# Patient Record
Sex: MALE | Race: WHITE
[De-identification: names, ages, dates, MRNs, and addresses within clinical notes are randomized per-mention and may not be internally consistent; named-entity substitution may affect disease eponyms.]

---

## 2017-10-19 ENCOUNTER — HOSPITAL ENCOUNTER (INPATIENT)
Dept: HOSPITAL 33 - MED SURG | Age: 62
LOS: 2 days | Discharge: HOME | DRG: 300 | End: 2017-10-21
Attending: FAMILY MEDICINE | Admitting: FAMILY MEDICINE
Payer: MEDICARE

## 2017-10-19 DIAGNOSIS — N17.9: ICD-10-CM

## 2017-10-19 DIAGNOSIS — I10: ICD-10-CM

## 2017-10-19 DIAGNOSIS — I73.9: ICD-10-CM

## 2017-10-19 DIAGNOSIS — E11.621: ICD-10-CM

## 2017-10-19 DIAGNOSIS — E78.5: ICD-10-CM

## 2017-10-19 DIAGNOSIS — Z79.4: ICD-10-CM

## 2017-10-19 DIAGNOSIS — I74.3: Primary | ICD-10-CM

## 2017-10-19 DIAGNOSIS — L97.519: ICD-10-CM

## 2017-10-19 DIAGNOSIS — E87.5: ICD-10-CM

## 2017-10-19 LAB
ANION GAP SERPL CALC-SCNC: 13.6 MEQ/L (ref 5–15)
BUN SERPL-MCNC: 38 MG/DL (ref 9–20)
CHLORIDE SERPL-SCNC: 104 MEQ/L (ref 98–107)
CO2 SERPL-SCNC: 23.2 MEQ/L (ref 21–32)
GLUCOSE SERPL-MCNC: 156 MG/DL (ref 70–110)
MCH RBC QN AUTO: 30.8 PG (ref 26–32)
PLATELET # BLD AUTO: 204 K/MM3 (ref 150–450)
POTASSIUM SERPLBLD-SCNC: 6 MEQ/L (ref 3.5–5.1)
RBC # BLD AUTO: 3.95 M/MM3 (ref 4.1–5.6)
SODIUM SERPL-SCNC: 135 MEQ/L (ref 136–145)
WBC # BLD AUTO: 6.3 K/MM3 (ref 4–10.5)

## 2017-10-19 PROCEDURE — 80048 BASIC METABOLIC PNL TOTAL CA: CPT

## 2017-10-19 PROCEDURE — 85027 COMPLETE CBC AUTOMATED: CPT

## 2017-10-19 PROCEDURE — 93005 ELECTROCARDIOGRAM TRACING: CPT

## 2017-10-19 PROCEDURE — 36415 COLL VENOUS BLD VENIPUNCTURE: CPT

## 2017-10-19 PROCEDURE — 83036 HEMOGLOBIN GLYCOSYLATED A1C: CPT

## 2017-10-19 PROCEDURE — 93926 LOWER EXTREMITY STUDY: CPT

## 2017-10-19 PROCEDURE — 73630 X-RAY EXAM OF FOOT: CPT

## 2017-10-19 PROCEDURE — 82962 GLUCOSE BLOOD TEST: CPT

## 2017-10-19 PROCEDURE — 82550 ASSAY OF CK (CPK): CPT

## 2017-10-19 PROCEDURE — 84134 ASSAY OF PREALBUMIN: CPT

## 2017-10-19 PROCEDURE — 80053 COMPREHEN METABOLIC PANEL: CPT

## 2017-10-19 RX ADMIN — SIMVASTATIN SCH MG: 20 TABLET, FILM COATED ORAL at 22:26

## 2017-10-19 RX ADMIN — FAMOTIDINE SCH MG: 20 TABLET, FILM COATED ORAL at 22:24

## 2017-10-19 RX ADMIN — OXYCODONE HYDROCHLORIDE AND ACETAMINOPHEN PRN TAB: 10; 325 TABLET ORAL at 15:03

## 2017-10-19 RX ADMIN — PREGABALIN SCH MG: 100 CAPSULE ORAL at 22:25

## 2017-10-19 NOTE — XRAY
Indication: 5th digit diabetic foot ulcer here



Comparison: October 13, 2017.



3 nonweightbearing views of the right foot unchanged again demonstrating

minimal 1st MTP degenerative changes, 5th metatarsal head osteophyte, tiny

heel spurs, and mild soft tissue swelling lateral to the 5th MTP.  No

new/acute findings.

## 2017-10-20 LAB
ALBUMIN SERPL-MCNC: 3.3 G/DL (ref 3.4–5)
ALP SERPL-CCNC: 35 U/L (ref 46–116)
ALT SERPL-CCNC: 30 U/L (ref 12–78)
ANION GAP SERPL CALC-SCNC: 12.7 MEQ/L (ref 5–15)
ANION GAP SERPL CALC-SCNC: 13.2 MEQ/L (ref 5–15)
AST SERPL QL: 18 U/L (ref 15–37)
BILIRUB BLD-MCNC: 0.3 MG/DL (ref 0.2–1)
BUN SERPL-MCNC: 28 MG/DL (ref 9–20)
BUN SERPL-MCNC: 31 MG/DL (ref 9–20)
CHLORIDE SERPL-SCNC: 105 MEQ/L (ref 98–107)
CHLORIDE SERPL-SCNC: 106 MEQ/L (ref 98–107)
CO2 SERPL-SCNC: 22.2 MEQ/L (ref 21–32)
CO2 SERPL-SCNC: 24.1 MEQ/L (ref 21–32)
GLUCOSE SERPL-MCNC: 120 MG/DL (ref 70–110)
GLUCOSE SERPL-MCNC: 95 MG/DL (ref 70–110)
MCH RBC QN AUTO: 30.9 PG (ref 26–32)
PLATELET # BLD AUTO: 198 K/MM3 (ref 150–450)
POTASSIUM SERPLBLD-SCNC: 5.8 MEQ/L (ref 3.5–5.1)
POTASSIUM SERPLBLD-SCNC: 6.4 MEQ/L (ref 3.5–5.1)
PROT SERPL-MCNC: 6.5 GM/DL (ref 6.4–8.2)
RBC # BLD AUTO: 3.81 M/MM3 (ref 4.1–5.6)
SODIUM SERPL-SCNC: 135 MEQ/L (ref 136–145)
SODIUM SERPL-SCNC: 136 MEQ/L (ref 136–145)
WBC # BLD AUTO: 6.8 K/MM3 (ref 4–10.5)

## 2017-10-20 RX ADMIN — SODIUM POLYSTYRENE SULFONATE SCH G: 15 SUSPENSION ORAL; RECTAL at 21:53

## 2017-10-20 RX ADMIN — SODIUM POLYSTYRENE SULFONATE SCH G: 15 SUSPENSION ORAL; RECTAL at 06:32

## 2017-10-20 RX ADMIN — SIMVASTATIN SCH MG: 20 TABLET, FILM COATED ORAL at 21:54

## 2017-10-20 RX ADMIN — SODIUM POLYSTYRENE SULFONATE SCH G: 15 SUSPENSION ORAL; RECTAL at 17:55

## 2017-10-20 RX ADMIN — PREGABALIN SCH MG: 100 CAPSULE ORAL at 21:54

## 2017-10-20 RX ADMIN — CILOSTAZOL SCH MG: 100 TABLET ORAL at 21:54

## 2017-10-20 RX ADMIN — SODIUM CHLORIDE SCH MLS/HR: 9 INJECTION, SOLUTION INTRAVENOUS at 10:29

## 2017-10-20 RX ADMIN — FAMOTIDINE SCH MG: 20 TABLET, FILM COATED ORAL at 21:54

## 2017-10-20 RX ADMIN — ASPIRIN SCH MG: 81 TABLET, COATED ORAL at 10:29

## 2017-10-20 RX ADMIN — PREGABALIN SCH MG: 100 CAPSULE ORAL at 10:30

## 2017-10-20 RX ADMIN — SODIUM POLYSTYRENE SULFONATE SCH G: 15 SUSPENSION ORAL; RECTAL at 13:59

## 2017-10-20 RX ADMIN — OXYCODONE HYDROCHLORIDE AND ACETAMINOPHEN PRN TAB: 10; 325 TABLET ORAL at 03:26

## 2017-10-20 RX ADMIN — SODIUM POLYSTYRENE SULFONATE SCH G: 15 SUSPENSION ORAL; RECTAL at 10:30

## 2017-10-20 RX ADMIN — PREGABALIN SCH MG: 100 CAPSULE ORAL at 14:00

## 2017-10-20 RX ADMIN — SITAGLIPTIN SCH MG: 50 TABLET, FILM COATED ORAL at 10:29

## 2017-10-20 RX ADMIN — FAMOTIDINE SCH MG: 20 TABLET, FILM COATED ORAL at 10:30

## 2017-10-20 NOTE — PCM.NOTE
Date and Time: 10/20/17  0616





Subjective Assessment: 





right foot still painful 


feels better if down


no fever or chills


no hx of palpitions or afib no hx of cold feet





Objective Exam


General Appearance: no apparent distress, alert


Neurologic Exam: alert, oriented x 3, cooperative, normal mood/affect, nml 

cerebellar function, sensation nml, No motor deficits


Skin Exam: normal color, warm, dry


Eye Exam: PERRL, EOMI, eyes nml inspection


Ears, Nose, Throat Exam: normal ENT inspection, pharynx normal, moist mucous 

membranes


Neck Exam: normal inspection, non-tender, supple, full range of motion


Respiratory Exam: normal breath sounds, lungs clear, No respiratory distress


Cardiovascular Exam: regular rate/rhythm, normal heart sounds


Gastrointestinal/Abdomen Exam: soft, No tenderness, No mass


Extremity Exam: normal inspection, normal range of motion, other (trace mahi 

pedal edema right 5th toe base with tender ulceration with surrounding redness 

diminshed peripheral pulses warm feet)


Back Exam: normal inspection, normal range of motion, No CVA tenderness, No 

vertebral tenderness


Male Genitalia Exam: deferred


Rectal Exam: deferred





OBJECTIVE DATA


Vital Signs: 


 Vital Signs - 24 hr











  Temp Pulse Resp BP BP Pulse Ox


 


 10/20/17 03:52  98.0 F  60  18   125/68  90 L


 


 10/19/17 23:51  97.8 F  61  18   108/72  95


 


 10/19/17 20:08  97.8 F  59 L  20   118/56  97


 


 10/19/17 15:54  97.8 F  67  20   129/69  97


 


 10/19/17 11:03  97.7 F  68  20   114/55  98


 


 10/19/17 10:36  97.7 F  68  20  114/55  114/55  98








 Pain Assessment - Last Documented











Pain Intensity                 9


 


Pain Scale Used                0-10 Pain Scale











Intake and Output: 


 Intake & Output











 10/17/17 10/18/17 10/19/17 10/20/17





 11:59 11:59 11:59 11:59


 


Intake Total    2327


 


Balance    2327


 


Weight   94.12 kg 











Lab Results: 


 Accuchecks











Date                           10/19/17


 


Date                           10/19/17


 


Date                           10/19/17


 


Time                           16:30


 


Time                           11:30


 


Accucheck Value:               90


 


Accucheck Value:               115


 


Accucheck Value:               228











 Lab Results-Last 24 Hours











  10/19/17 10/19/17 10/20/17 Range/Units





  12:40 12:40 05:00 


 


WBC  6.3    (4.0-10.5)  K/mm3


 


RBC  3.95 L    (4.1-5.6)  M/mm3


 


Hgb  12.2 L    (12.5-18.0)  gm/dl


 


Hct  38.8 L    (42-50)  %


 


MCV  98.2    ()  fl


 


MCH  30.8    (26-32)  pg


 


MCHC  31.4 L    (32-36)  g/dl


 


RDW  14.9 H    (11.5-14.0)  %


 


Plt Count  204    (150-450)  K/mm3


 


MPV  10.7 H    (6-9.5)  fl


 


Sodium   135 L   (136-145)  mEq/L


 


Potassium   6.0 H*   (3.5-5.1)  mEq/L


 


Chloride   104   ()  mEq/L


 


Carbon Dioxide   23.2   (21-32)  mEq/L


 


Anion Gap   13.6   (5-15)  MEQ/L


 


BUN   38 H   (9-20)  mg/dL


 


Creatinine   2.44 H   (0.55-1.30)  mg/dl


 


Estimated GFR   29   ML/MIN


 


Glucose   156 H   ()  MG/DL


 


Hemoglobin A1c    7.3 H  (4.5-6.2)  


 


Calcium   9.1   (8.5-10.1)  mg/dL


 


Total Bilirubin     (0.2-1.0)  mg/dL


 


AST     (15-37)  U/L


 


ALT     (12-78)  U/L


 


Alkaline Phosphatase     ()  U/L


 


Serum Total Protein     (6.4-8.2)  gm/dL


 


Albumin     (3.4-5.0)  g/dL


 


Prealbumin   34.8   (18.0-35.7)  mg/dL














  10/20/17 10/20/17 Range/Units





  05:00 05:00 


 


WBC  6.8   (4.0-10.5)  K/mm3


 


RBC  3.81 L   (4.1-5.6)  M/mm3


 


Hgb  11.8 L   (12.5-18.0)  gm/dl


 


Hct  37.6 L   (42-50)  %


 


MCV  98.7   ()  fl


 


MCH  30.9   (26-32)  pg


 


MCHC  31.4 L   (32-36)  g/dl


 


RDW  14.8 H   (11.5-14.0)  %


 


Plt Count  198   (150-450)  K/mm3


 


MPV  11.1 H   (6-9.5)  fl


 


Sodium   135 L  (136-145)  mEq/L


 


Potassium   6.4 H*  (3.5-5.1)  mEq/L


 


Chloride   106  ()  mEq/L


 


Carbon Dioxide   22.2  (21-32)  mEq/L


 


Anion Gap   13.2  (5-15)  MEQ/L


 


BUN   31 H  (9-20)  mg/dL


 


Creatinine   2.16 H  (0.55-1.30)  mg/dl


 


Estimated GFR   33  ML/MIN


 


Glucose   95  ()  MG/DL


 


Hemoglobin A1c    (4.5-6.2)  


 


Calcium   8.6  (8.5-10.1)  mg/dL


 


Total Bilirubin   0.30  (0.2-1.0)  mg/dL


 


AST   18  (15-37)  U/L


 


ALT   30  (12-78)  U/L


 


Alkaline Phosphatase   35 L  ()  U/L


 


Serum Total Protein   6.5  (6.4-8.2)  gm/dL


 


Albumin   3.3 L  (3.4-5.0)  g/dL


 


Prealbumin    (18.0-35.7)  mg/dL











Radiology Exams: 


 Radiology Procedures











 Category Date Time Status


 


 ARTERIAL UNILAT/LTD LOWER EXT [US] Routine Exams  10/20/17 Ordered


 


 FOOT (MINIMUM 3 VIEWS) Routine Exams  10/19/17 11:30 Completed











Multi-Disciplinary Progress Notes: 


 Multi-Disciplinary Progress Notes





10/19/17 15:03 Pharmacy Note by Hola Grier


PHARMACY VANCOMYCIN DOSING


WE GAVE HIM 1.5H VANCOMYCIN LOADING DOSE, TO BE FOLLOWED BY 1G Q 24 HRS


CREATININ ELEVATED AT 2.44 WITH ESTIMATED CREATININE CLEARANCE 30 ML/MIN


WILL CHECK VANCOMYCIN TROUGH AT 0930 ON JERZY 10/22/17





Initialized on 10/19/17 15:03 - END OF NOTE

















Assessment/Plan


(1) Diabetic foot ulcer


Current Visit: Yes   Status: Acute   


Assessment & Plan: 


check arterial u/s rule out ischemic toe


on tele NSR since arrival


Code(s): E11.621 - TYPE 2 DIABETES MELLITUS WITH FOOT ULCER; L97.509 - NON-

PRESSURE CHRONIC ULCER OTH PRT UNSP FOOT W UNSP SEVERITY   





(2) Acute renal failure


Current Visit: Yes   Status: Acute   


Assessment & Plan: 


secondary to bactrim as outpatient


continue hydration


kayexalate start stat


inulin + D50 now


repeat K in 6 h


d/c lisinopril


continue tele








(3) Hyperkalemia


Current Visit: Yes   Status: Acute   Code(s): E87.5 - HYPERKALEMIA   





(4) Diabetes mellitus


Current Visit: Yes   Status: Acute   Code(s): E11.9 - TYPE 2 DIABETES MELLITUS 

WITHOUT COMPLICATIONS   





(5) Failure of outpatient treatment


Current Visit: Yes   Status: Acute   Code(s): Z78.9 - OTHER SPECIFIED HEALTH 

STATUS

## 2017-10-20 NOTE — XRAY
Indication: PVD.  Ulcer.



Two-dimensional sonogram and color Doppler imaging of the major arteries of

the right leg was performed.



Comparison: None



Popliteal artery is occluded.  Elsewhere minimal scattered arteriosclerotic

disease seen of the visualized common femoral, superficial femoral, posterior

tibial, and dorsal pedal arteries without critical stenosis/obstruction.

Common femoral artery demonstrates normal multiphasic arterial waveforms.  The

superficial femoral, posterior tibial, and dorsal pedal arteries demonstrates

monophasic arterial waveforms.



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

monophasic arterial flow.  No critical stenosis/obstruction in the upper leg.

## 2017-10-21 VITALS — SYSTOLIC BLOOD PRESSURE: 117 MMHG | HEART RATE: 64 BPM | DIASTOLIC BLOOD PRESSURE: 59 MMHG

## 2017-10-21 VITALS — OXYGEN SATURATION: 94 %

## 2017-10-21 LAB
ANION GAP SERPL CALC-SCNC: 14 MEQ/L (ref 5–15)
BUN SERPL-MCNC: 23 MG/DL (ref 9–20)
CHLORIDE SERPL-SCNC: 106 MEQ/L (ref 98–107)
CO2 SERPL-SCNC: 23.9 MEQ/L (ref 21–32)
GLUCOSE SERPL-MCNC: 108 MG/DL (ref 70–110)
MCH RBC QN AUTO: 30.7 PG (ref 26–32)
PLATELET # BLD AUTO: 193 K/MM3 (ref 150–450)
POTASSIUM SERPLBLD-SCNC: 5.2 MEQ/L (ref 3.5–5.1)
RBC # BLD AUTO: 3.8 M/MM3 (ref 4.1–5.6)
SODIUM SERPL-SCNC: 139 MEQ/L (ref 136–145)
WBC # BLD AUTO: 5.5 K/MM3 (ref 4–10.5)

## 2017-10-21 RX ADMIN — FAMOTIDINE SCH MG: 20 TABLET, FILM COATED ORAL at 08:13

## 2017-10-21 RX ADMIN — CILOSTAZOL SCH MG: 100 TABLET ORAL at 08:13

## 2017-10-21 RX ADMIN — PREGABALIN SCH MG: 100 CAPSULE ORAL at 08:13

## 2017-10-21 RX ADMIN — OXYCODONE HYDROCHLORIDE AND ACETAMINOPHEN PRN TAB: 10; 325 TABLET ORAL at 03:52

## 2017-10-21 RX ADMIN — SODIUM CHLORIDE SCH MLS/HR: 9 INJECTION, SOLUTION INTRAVENOUS at 08:13

## 2017-10-21 RX ADMIN — ASPIRIN SCH MG: 81 TABLET, COATED ORAL at 08:13

## 2017-10-21 RX ADMIN — SITAGLIPTIN SCH MG: 50 TABLET, FILM COATED ORAL at 08:13

## 2017-10-21 NOTE — PCM.DCORD
- Discharge


Discharge Date: 10/21/17


Disposition: Home, Self-Care


Condition: Stable


Prescriptions: 


New


   Clindamycin HCl 300 mg PO QID #28 capsule


   Atorvastatin Calcium [Lipitor] 80 mg PO DAILY #30 tablet


   Nicotine [Nicoderm Cq] 21 mg TD DAILY #30 patch.td24


   Cilostazol 100 mg*** [Pletal 100 MG***] 100 mg PO BID #60 tablet





Continue


   Glipizide 2.5 mg*** [Glucotrol Xl 2.5 MG***] 10 mg PO BID


   Fenofibrate Nanocrystallized [Fenofibrate] 145 mg PO DAILY


   Linagliptin [Tradjenta] 5 mg PO DAILY


   Ranitidine HCl 150 mg PO BID


   Hydrocodone/Acetaminophen [Hydrocodon-Acetaminophn ] 1 tab PO Q6H PRN 

PRN


     PRN Reason: Pain


   Pregabalin [Lyrica 100Mg***] 100 mg PO TID


   Aspirin 81 gm Chew*** [Baby Aspirin 81 mg Chew***] 81 mg PO DAILY





Discontinued


   Pravastatin Sodium 10 mg [Pravachol 10 MG] 80 mg PO DAILY


   Metformin HCl 500 mg*** [Glucophage 500 MG] 500 mg PO BID


   Lisinopril 40 mg PO DAILY


   Canagliflozin [Invokana] 300 mg PO HS


Additional Instructions: 


If your foot pain worsens, your foot becomes cold, dusky or blue, you need to 

present to the emergency department immediately.


You will be contacted by our clinic about an appointment with a specialist for 

your blood vessels.


Please do not smoke and only take the medications as prescribed. 


Drink plenty of water.


Follow up with: 


REYNA DE LA ROSA MD [Primary Care Provider] - 1 Week

## 2017-10-21 NOTE — PCM.DS
Discharge Summary


Date of Admission: 


10/19/17 10:30





Date of Discharge: 


10/21/17





Admitting Physician: 


MARYLOU MAHAJAN





Primary Care Provider: 


REYNA DE LA ROSA








Allergies


Allergies





codeine [Codeine] Adverse Reaction (Intermediate, Verified 10/19/17 10:57)


 Nausea and Vomiting











Hospital Summary





- Hospital Course


Hospital Course: 





He presented to Dr. Mahajan's office with very painful ulcer on the right 5th 

toe that was treated for 1 week with bactrim and not improving. She admitted 

him for iv antibiotics. On evaluation on hospital day 1 he noted the pain was 

better if he had his legs off the bed. Arterial ultrasound showed popliteal 

artery occlusion with diminished monophasic flow below. His foot was warm with 

good cap refill of 2 secs and no pain except on the tiny ulcerated area on the 

5th toe about 5mm on the base of the toe. It had a hard dark necrotic center 

that on 10/21/17 was cleaned with betadine and debrided with 11 blade of the 

tiny central core of necrotic tissue with good viable tissue under this. It was 

covered with bacitracin and nonstick dressing and wrapped. He was started on 

pletal on 10/20/17 as well as his statin and aspirin and he stated the pain was 

improved and only at the ulcerated site. With the acute kidney failure we 

discussed with lack of significant symptoms now would like to improve renal 

function prior to any contrast studies or procedures for the leg. 





On arrival he was found to have hyperkalemia and acute renal failure due to the 

Bactim. He was treated with iv hydration initially and on 10/20/17 the K was 

actually a little higher his lisinopril was stopped. He was treated with iv 

regular insulin and dextrose as well as kayexalate and continued iv hydration 

and it improved. His renal function continued to slowly improve as well. With 

the hydration. His blood pressure remained controlled off the lisinopril. 





For his diabetes his invokona, metformin, and glipizide were held. his sugars 

were well controlled on sliding scale and A1c of 7.3% was done.





We discussed his renal failure and desire for continued iv hydration and 

monitoring of the potassium inpatient; however he wanted to leave A on 10/20 

due to animals he had at home to care for but we convinced him to stay the 

night to hydrate and monitor the K. We discussed possible inpatient referral 

for the occluded popliteal artery but he is insistent on going home now as he 

"feels much better". He has no current pain in the foot except at the small 

ulcerated area that was debrided. 





We had discussion about importance of smoking cessation and risk of toe, foot, 

or leg amputation should he  restart smoking. 


Also discussed importance of only taking the medications as discussed at 

discharge until f/u. Stop the invokana, metformin, lisinopril, and bactrim.


Close f/u with recheck wound this week. 


Present to ED immediately with cold, dusky, or painful foot.


Stay well hydrated plenty of water. 


Repeat labs next week. 


We are making referral to Dr. López outpatient for further evaluation of his 

peripheral artery disease. 





- Vitals & Intake/Output


Vital Signs: 





 Vital Signs











Temperature  98.1 F   10/21/17 07:49


 


Pulse Rate  64   10/21/17 07:49


 


Respiratory Rate  18   10/21/17 07:49


 


Blood Pressure  117/59   10/21/17 07:49


 


O2 Sat by Pulse Oximetry  94 L  10/21/17 07:49











Intake & Output: 





 Intake & Output











 10/18/17 10/19/17 10/20/17 10/21/17





 11:59 11:59 11:59 11:59


 


Intake Total   3127 5413


 


Balance   3127 5413


 


Weight  94.12 kg  94.12 kg














- Lab


Result Diagrams: 


 10/21/17 05:24





 10/21/17 05:24


Lab Results-Last 24 Hrs: 





 Accuchecks











Date                           10/21/17


 


Date                           10/20/17


 


Date                           10/20/17


 


Date                           10/20/17


 


Time                           07:30


 


Time                           21:00


 


Time                           16:30


 


Time                           12:53


 


Accucheck Value:               111


 


Accucheck Value:               144


 


Accucheck Value:               154


 


Accucheck Value:               88











 Lab Results-Last 24 Hours











  10/20/17 10/21/17 10/21/17 Range/Units





  13:08 05:24 05:24 


 


WBC   5.5   (4.0-10.5)  K/mm3


 


RBC   3.80 L   (4.1-5.6)  M/mm3


 


Hgb   11.7 L   (12.5-18.0)  gm/dl


 


Hct   37.4 L   (42-50)  %


 


MCV   98.4   ()  fl


 


MCH   30.7   (26-32)  pg


 


MCHC   31.3 L   (32-36)  g/dl


 


RDW   14.5 H   (11.5-14.0)  %


 


Plt Count   193   (150-450)  K/mm3


 


MPV   10.8 H   (6-9.5)  fl


 


Sodium  136   139  (136-145)  mEq/L


 


Potassium  5.8 H   5.2 H  (3.5-5.1)  mEq/L


 


Chloride  105   106  ()  mEq/L


 


Carbon Dioxide  24.1   23.9  (21-32)  mEq/L


 


Anion Gap  12.7   14.0  (5-15)  MEQ/L


 


BUN  28 H   23 H  (9-20)  mg/dL


 


Creatinine  2.13 H   1.94 H  (0.55-1.30)  mg/dl


 


Estimated GFR  34   37  ML/MIN


 


Glucose  120 H   108  ()  MG/DL


 


Calcium  8.6   8.7  (8.5-10.1)  mg/dL


 


Creatine Kinase     ()  U/L














  10/21/17 Range/Units





  05:24 


 


WBC   (4.0-10.5)  K/mm3


 


RBC   (4.1-5.6)  M/mm3


 


Hgb   (12.5-18.0)  gm/dl


 


Hct   (42-50)  %


 


MCV   ()  fl


 


MCH   (26-32)  pg


 


MCHC   (32-36)  g/dl


 


RDW   (11.5-14.0)  %


 


Plt Count   (150-450)  K/mm3


 


MPV   (6-9.5)  fl


 


Sodium   (136-145)  mEq/L


 


Potassium   (3.5-5.1)  mEq/L


 


Chloride   ()  mEq/L


 


Carbon Dioxide   (21-32)  mEq/L


 


Anion Gap   (5-15)  MEQ/L


 


BUN   (9-20)  mg/dL


 


Creatinine   (0.55-1.30)  mg/dl


 


Estimated GFR   ML/MIN


 


Glucose   ()  MG/DL


 


Calcium   (8.5-10.1)  mg/dL


 


Creatine Kinase  75  ()  U/L











Micro Results-Entire Visit: 





 Accuchecks











Date                           10/21/17


 


Date                           10/20/17


 


Date                           10/20/17


 


Date                           10/20/17


 


Time                           07:30


 


Time                           21:00


 


Time                           16:30


 


Time                           12:53


 


Accucheck Value:               111


 


Accucheck Value:               144


 


Accucheck Value:               154


 


Accucheck Value:               88

















- Radiology Exams


Ordered Rad Exams-Entire Visit: 





 Radiology Procedures











 Category Date Time Status


 


 ARTERIAL UNILAT/LTD LOWER EXT [US] Routine Exams  10/20/17 Completed


 


 FOOT (MINIMUM 3 VIEWS) Routine Exams  10/19/17 11:30 Completed














- Procedures and Test


Procedures and Tests throughout Hospitalization: 





 Therapy Orders & Screens





10/19/17 10:53


PT Eval & Treat (MD Order) ROUTINE 


   Evaluate: Yes


   Treat: Yes


   Reason for Eval:: diabetic foot wound


   Diagnosis: Diabetic foot wound





10/19/17 11:54


Smoking Cessation Education ONCE 


   Comment: 


   Diagnosis: Diabetic foot wound


   Smoking Status: Heavy tobacco smoker


   How long have you smoked: since he w


   Have you smoked in the past 12 months: Yes


   Approximately how many cigarettes per day: pack a day


   Do you dip or chew tobacco: No





10/19/17 13:59


EKG ROUTINE 


   Comment: High Potassium


   Diagnosis: Diabetic foot wound





10/19/17 14:57


PT Eval & Treat (MD Order) ROUTINE 


   Evaluate: Yes


   Treat: Yes


   Reason for Eval:: wound on foot with small eschar


   Diagnosis: Diabetic foot wound














Discharge Exam


General Appearance: no apparent distress, alert


Neurologic Exam: alert, oriented x 3, cooperative, normal mood/affect, nml 

cerebellar function, sensation nml, No motor deficits


Skin Exam: normal color, warm, dry


Eye Exam: PERRL, EOMI, eyes nml inspection


Ears, Nose, Throat Exam: normal ENT inspection, pharynx normal, moist mucous 

membranes


Neck Exam: normal inspection, non-tender, supple, full range of motion


Respiratory Exam: normal breath sounds, lungs clear, No respiratory distress


Cardiovascular Exam: regular rate/rhythm, normal heart sounds


Gastrointestinal/Abdomen Exam: soft, No tenderness, No mass


Extremity Exam: normal inspection, normal range of motion, other (bilateral 

feet warm with cap refill of 2 sec dorsal pedal and post tibial pulse dimished 

decreased hair. right 5th toe with tender painful ulceration 5mm on base of toe 

with 2sec cap refill in tip of 5th toe and debridement of the tiny center to 

viable center.)


Back Exam: normal inspection, normal range of motion, No CVA tenderness, No 

vertebral tenderness


Male Genitalia Exam: deferred


Rectal Exam: deferred





Final Diagnosis/Problem List





- Final Discharge Diagnosis/Problem


(1) Popliteal artery occlusion, right


Current Visit: Yes   Status: Acute   





(2) Peripheral artery disease


Current Visit: Yes   Status: Acute   





(3) Diabetic foot ulcer


Current Visit: Yes   Status: Acute   





(4) Acute renal failure


Current Visit: Yes   Status: Acute   





(5) Hyperkalemia


Current Visit: Yes   Status: Acute   





(6) Diabetes mellitus


Current Visit: Yes   Status: Acute   





- Discharge


Discharge Date: 10/21/17


Disposition: Home, Self-Care


Condition: Stable


Prescriptions: 


New


   Clindamycin HCl 300 mg PO QID #28 capsule


   Atorvastatin Calcium [Lipitor] 80 mg PO DAILY #30 tablet


   Nicotine [Nicoderm Cq] 21 mg TD DAILY #30 patch.td24


   Cilostazol 100 mg*** [Pletal 100 MG***] 100 mg PO BID #60 tablet





Continue


   Glipizide 2.5 mg*** [Glucotrol Xl 2.5 MG***] 10 mg PO BID


   Fenofibrate Nanocrystallized [Fenofibrate] 145 mg PO DAILY


   Linagliptin [Tradjenta] 5 mg PO DAILY


   Ranitidine HCl 150 mg PO BID


   Hydrocodone/Acetaminophen [Hydrocodon-Acetaminophn ] 1 tab PO Q6H PRN 

PRN


     PRN Reason: Pain


   Pregabalin [Lyrica 100Mg***] 100 mg PO TID


   Aspirin 81 gm Chew*** [Baby Aspirin 81 mg Chew***] 81 mg PO DAILY





Discontinued


   Pravastatin Sodium 10 mg [Pravachol 10 MG] 80 mg PO DAILY


   Metformin HCl 500 mg*** [Glucophage 500 MG] 500 mg PO BID


   Lisinopril 40 mg PO DAILY


   Canagliflozin [Invokana] 300 mg PO HS


Additional Instructions: 


If your foot pain worsens, your foot becomes cold, dusky or blue, you need to 

present to the emergency department immediately.


You will be contacted by our clinic about an appointment with a specialist for 

your blood vessels.


Please do not smoke and only take the medications as prescribed. 


Drink plenty of water.


Follow up with: 


REYNA DE LA ROSA MD [Primary Care Provider] - 10/27/17 3:30 pm

## 2021-06-21 ENCOUNTER — HOSPITAL ENCOUNTER (EMERGENCY)
Dept: HOSPITAL 33 - ED | Age: 66
Discharge: HOME | End: 2021-06-21
Payer: MEDICARE

## 2021-06-21 VITALS — SYSTOLIC BLOOD PRESSURE: 160 MMHG | OXYGEN SATURATION: 96 % | DIASTOLIC BLOOD PRESSURE: 73 MMHG | HEART RATE: 72 BPM

## 2021-06-21 DIAGNOSIS — I10: ICD-10-CM

## 2021-06-21 DIAGNOSIS — R10.31: Primary | ICD-10-CM

## 2021-06-21 DIAGNOSIS — Z79.899: ICD-10-CM

## 2021-06-21 DIAGNOSIS — E78.00: ICD-10-CM

## 2021-06-21 LAB
ALBUMIN SERPL-MCNC: 4.3 G/DL (ref 3.5–5)
ALP SERPL-CCNC: 70 U/L (ref 38–126)
ALT SERPL-CCNC: 18 U/L (ref 0–50)
ANION GAP SERPL CALC-SCNC: 15.4 MEQ/L (ref 5–15)
AST SERPL QL: 25 U/L (ref 17–59)
BASOPHILS # BLD AUTO: 0.02 10*3/UL (ref 0–0.4)
BASOPHILS NFR BLD AUTO: 0.2 % (ref 0–0.4)
BILIRUB BLD-MCNC: 0.5 MG/DL (ref 0.2–1.3)
BUN SERPL-MCNC: 29 MG/DL (ref 9–20)
CALCIUM SPEC-MCNC: 9.7 MG/DL (ref 8.4–10.2)
CHLORIDE SERPL-SCNC: 99 MMOL/L (ref 98–107)
CO2 SERPL-SCNC: 26 MMOL/L (ref 22–30)
CREAT SERPL-MCNC: 1.81 MG/DL (ref 0.66–1.25)
EOSINOPHIL # BLD AUTO: 0.27 10*3/UL (ref 0–0.5)
FATTY CASTS #/AREA UR COMP ASSIST: (no result) /LPF
GFR SERPLBLD BASED ON 1.73 SQ M-ARVRAT: 39.9 ML/MIN
GLUCOSE SERPL-MCNC: 114 MG/DL (ref 74–106)
GLUCOSE UR-MCNC: >=500 MG/DL
HCT VFR BLD AUTO: 38.6 % (ref 42–50)
HGB BLD-MCNC: 11.8 GM/DL (ref 12.5–18)
LIPASE SERPL-CCNC: 23 U/L (ref 23–300)
LYMPHOCYTES # SPEC AUTO: 1.77 10*3/UL (ref 1–4.6)
MCH RBC QN AUTO: 29.2 PG (ref 26–32)
MCHC RBC AUTO-ENTMCNC: 30.6 G/DL (ref 32–36)
MONOCYTES # BLD AUTO: 1.03 10*3/UL (ref 0–1.3)
PLATELET # BLD AUTO: 317 K/MM3 (ref 150–450)
POTASSIUM SERPLBLD-SCNC: 3.9 MMOL/L (ref 3.5–5.1)
PROT SERPL-MCNC: 7.6 G/DL (ref 6.3–8.2)
PROT UR STRIP-MCNC: 30 MG/DL
RBC # BLD AUTO: 4.04 M/MM3 (ref 4.1–5.6)
RBC #/AREA URNS HPF: (no result) /HPF (ref 0–2)
SODIUM SERPL-SCNC: 137 MMOL/L (ref 137–145)
WBC # BLD AUTO: 11.6 K/MM3 (ref 4–10.5)
WBC #/AREA URNS HPF: (no result) /HPF (ref 0–5)

## 2021-06-21 PROCEDURE — 96376 TX/PRO/DX INJ SAME DRUG ADON: CPT

## 2021-06-21 PROCEDURE — 36000 PLACE NEEDLE IN VEIN: CPT

## 2021-06-21 PROCEDURE — 36415 COLL VENOUS BLD VENIPUNCTURE: CPT

## 2021-06-21 PROCEDURE — 96375 TX/PRO/DX INJ NEW DRUG ADDON: CPT

## 2021-06-21 PROCEDURE — 85025 COMPLETE CBC W/AUTO DIFF WBC: CPT

## 2021-06-21 PROCEDURE — 96374 THER/PROPH/DIAG INJ IV PUSH: CPT

## 2021-06-21 PROCEDURE — 99284 EMERGENCY DEPT VISIT MOD MDM: CPT

## 2021-06-21 PROCEDURE — 83605 ASSAY OF LACTIC ACID: CPT

## 2021-06-21 PROCEDURE — 74176 CT ABD & PELVIS W/O CONTRAST: CPT

## 2021-06-21 PROCEDURE — 81001 URINALYSIS AUTO W/SCOPE: CPT

## 2021-06-21 PROCEDURE — 80053 COMPREHEN METABOLIC PANEL: CPT

## 2021-06-21 PROCEDURE — 83690 ASSAY OF LIPASE: CPT

## 2021-06-21 NOTE — ERPHSYRPT
- History of Present Illness


Time Seen by Provider: 06/21/21 10:22


Patient Subjective Stated Complaint: Patient states pain started yesterday 

afternoon and it "got bad and hasnt let up" states he hasnt slept and cannot get

comfortable. States he has a surgery scheduled to get gallbladder out next 

monday. States he recently had an ultrasound and gallbladder "has a bunch of 

sludge in it".


Triage Nursing Assessment: Patient presents to ED c/o abdominal pain. Holding 

his RLQ while walking into room. Able to collect a urine upon arrival. Bowel 

sounds presnt x4, RLQ tenderness upon palpation, rebound tenderness present


Physician History: 





65 years old male with a history of diabetes mellitus, gallstones scheduled for 

cholecystectomy next week presented in ER with chief complaint of right lower 

quadrant pain since yesterday afternoon, constant, moderate to severe intensity,

sharp in nature, nonradiating, aggravated with movements palpation/walking and 

no significant relieving factors.  Denies any associated nausea vomiting or 

urinary complaints.  No testicular pain or swelling.  He denies fever chills


Timing/Duration: yesterday, sudden, worse


Activities at Onset: rest


Quality: sharpness


Abdominal Pain Onset Location: RLQ


Pain Radiation: no radiation


Severity of Pain-Max: severe


Severity of Pain-Current: severe


Modifying Factors: Worsens With: movement, palpation


Associated Symptoms: denies symptoms


Previous symptoms: no prior history


Allergies/Adverse Reactions: 








codeine [Codeine] Adverse Reaction (Intermediate, Verified 06/21/21 10:17)


   Nausea and Vomiting





Home Medications: 








Fenofibrate Nanocrystallized [Fenofibrate] 145 mg PO DAILY 10/19/17 [History]


Pregabalin [Lyrica 100Mg***] 100 mg PO TID 10/19/17 [History]


Clopidogrel Bisulfate 75 mg*** [PLAVIX 75 MG Tablet***] 75 mg PO DAILY 06/17/21 

[History]


Empagliflozin [Jardiance] 10 mg PO DAILY 06/17/21 [History]


Insulin Detemir [Levemir] 75 units SQ BID 06/17/21 [History]


Lisinopril/Hydrochlorothiazide [Lisinopril-Hctz 10-12.5 mg Tab] 1 tab PO DAILY 

06/17/21 [History]


PANTOPRAZOLE 40 mg Tablet*** [Protonix 40MG Tablet***] 1 tab PO DAILY 06/17/21 

[History]


Aspirin 81 gm Chew*** [Baby Aspirin 81 mg Chew***] 81 mg PO DAILY 06/21/21 

[History]





Hx Tetanus, Diphtheria Vaccination/Date Given: No


Hx Influenza Vaccination/Date Given: Yes (2012)


Hx Pneumococcal Vaccination/Date Given: Yes (2012)





Travel Risk





- International Travel


Have you traveled outside of the country in past 3 weeks: No





- Coronavirus Screening


Are you exhibiting any of the following symptoms?: No


Close contact with a COVID-19 positive Pt in past 14-21 Days: No





- Vaccine Status


Have you recieved a Covid-19 vaccination: Yes


: Plyce





- Review of Systems


Constitutional: No Symptoms


Eyes: No Symptoms


Ears, Nose, & Throat: No Symptoms


Respiratory: No Symptoms


Cardiac: No Symptoms


Abdominal/Gastrointestinal: Abdominal Pain


Genitourinary Symptoms: No Symptoms


Musculoskeletal: No Symptoms


Skin: No Symptoms


Neurological: No Symptoms


Psychological: No Symptoms


Endocrine: No Symptoms


Hematologic/Lymphatic: No Symptoms


Immunological/Allergic: No Symptoms





- Past Medical History


Pertinent Past Medical History: Yes (.)


Neurological History: No Pertinent History


ENT History: No Pertinent History


Cardiac History: High Cholesterol, Hypertension


Respiratory History: No Pertinent History


Endocrine Medical History: Diabetes Type II


Musculoskeletal History: Other


GI Medical History: GERD


 History: No Pertinent History


Psycho-Social History: No Pertinent History


Male Reproductive Disorders: No Pertinent History





- Past Surgical History


Past Surgical History: Yes


Neuro Surgical History: No Pertinent History


Cardiac: Vascular Surgery


Respiratory: No Pertinent History


Gastrointestinal: No Pertinent History


Genitourinary: No Pertinent History


Musculoskeletal: Orthopedic Surgery


Male Surgical History: No Pertinent History


Other Surgical History: BACK ,NECK AND ELBOW SURG,LEFT SHOULDER. vascular 

surgery to right knee(stent placed in right knee)





- Social History


Smoking Status: Former smoker


How long have you smoked: 45 years


Exposure to second hand smoke: No


Alcohol Use: None


Drug Use: none


Patient Lives Alone: Yes


Significant Family History: no pertinent family hx





- Nursing Vital Signs


Nursing Vital Signs: 


                               Initial Vital Signs











Temperature  99.1 F   06/21/21 09:57


 


Pulse Rate  82   06/21/21 09:57


 


Blood Pressure  188/94   06/21/21 09:57


 


O2 Sat by Pulse Oximetry  95   06/21/21 09:57








                                   Pain Scale











Pain Intensity                 7

















- Physical Exam


General Appearance: no apparent distress, alert


Eye Exam: PERRL/EOMI


Ears, Nose, Throat Exam: normal ENT inspection, pharynx normal


Neck Exam: normal inspection, non-tender, supple, full range of motion


Respiratory Exam: normal breath sounds, lungs clear


Cardiovascular Exam: regular rate/rhythm, normal heart sounds


Gastrointestinal/Abdomen Exam: soft, tenderness, guarding (Right lower quadrant 

positive rebound tenderness.  Also has tenderness to the right upper quadrant 

positive Hammond sign)


Extremity Exam: normal inspection, normal range of motion


Neurologic Exam: alert, oriented x 3, cooperative


Skin Exam: normal color


**SpO2 Interpretation**: normal


SpO2: 95


O2 Delivery: Room Air


Ordered Tests: 


                               Active Orders 24 hr











 Category Date Time Status


 


 IV Insertion STAT Care  06/21/21 10:26 Active


 


 NPO (ED) STAT Care  06/21/21 10:26 Active


 


 ABDOMEN AND PELVIS W/0 CONTRAS [CT] Stat Exams  06/21/21 10:27 Completed


 


 CBC W DIFF Stat Lab  06/21/21 10:10 Completed


 


 CMP Stat Lab  06/21/21 10:10 Completed


 


 LIPASE Stat Lab  06/21/21 10:10 Completed


 


 Lactic Acid Stat Lab  06/21/21 10:26 Completed


 


 UA W/RFX UR CULTURE Stat Lab  06/21/21 10:26 Completed








Medication Summary











Generic Name Dose Route Start Last Admin





  Trade Name Freq  PRN Reason Stop Dose Admin


 


Sodium Chloride  1,000 mls @ 125 mls/hr  06/21/21 10:30  06/21/21 10:45





  Sodium Chloride 0.9% 1000 Ml  IV  07/21/21 10:29  125 mls/hr





  .Q8H ROBIN   Administration














Discontinued Medications














Generic Name Dose Route Start Last Admin





  Trade Name Freq  PRN Reason Stop Dose Admin


 


Hydromorphone HCl  1 mg  06/21/21 13:19  06/21/21 13:21





  Hydromorphone 1 Mg/Ml Injection***  IV  06/21/21 13:20  1 mg





  STAT ONE   Administration


 


Hydromorphone HCl  Confirm  06/21/21 13:20 





  Hydromorphone 1 Mg/Ml Injection***  Administered  06/21/21 13:21 





  Dose  





  1 mg  





  .ROUTE  





  .STK-MED ONE  


 


Morphine Sulfate  4 mg  06/21/21 10:26  06/21/21 10:45





  Morphine Sulfate 4 Mg Inj***  IV  06/21/21 10:27  4 mg





  STAT ONE   Administration


 


Morphine Sulfate  Confirm  06/21/21 10:44 





  Morphine Sulfate 4 Mg Inj***  Administered  06/21/21 10:45 





  Dose  





  4 mg  





  .ROUTE  





  .STK-MED ONE  


 


Morphine Sulfate  4 mg  06/21/21 12:35  06/21/21 12:38





  Morphine Sulfate 4 Mg Inj***  IV  06/21/21 12:36  4 mg





  STAT ONE   Administration


 


Morphine Sulfate  Confirm  06/21/21 12:37 





  Morphine Sulfate 4 Mg Inj***  Administered  06/21/21 12:38 





  Dose  





  4 mg  





  .ROUTE  





  .STK-MED ONE  


 


Ondansetron HCl  4 mg  06/21/21 10:26  06/21/21 10:45





  Zofran 4 Mg/2 Ml Vial**  IV  06/21/21 10:27  4 mg





  STAT ONE   Administration


 


Ondansetron HCl  Confirm  06/21/21 10:44 





  Zofran 4 Mg/2 Ml Vial**  Administered  06/21/21 10:45 





  Dose  





  4 mg  





  .ROUTE  





  .STK-MED ONE  











Lab/Rad Data: 


                           Laboratory Result Diagrams





                                 06/21/21 10:10 





                                 06/21/21 10:10 





                               Laboratory Results











  06/21/21 06/21/21 06/21/21 Range/Units





  10:26 10:26 10:10 


 


WBC     (4.0-10.5)  K/mm3


 


RBC     (4.1-5.6)  M/mm3


 


Hgb     (12.5-18.0)  gm/dl


 


Hct     (42-50)  %


 


MCV     ()  fl


 


MCH     (26-32)  pg


 


MCHC     (32-36)  g/dl


 


RDW     (11.5-14.0)  %


 


Plt Count     (150-450)  K/mm3


 


MPV     (7.5-11.0)  fl


 


Gran %     (36.0-66.0)  %


 


Eos # (Auto)     (0-0.5)  


 


Absolute Lymphs (auto)     (1.0-4.6)  


 


Absolute Monos (auto)     (0.0-1.3)  


 


Lymphocytes %     (24.0-44.0)  %


 


Monocytes %     (0.0-12.0)  %


 


Eosinophils %     (0.00-5.0)  %


 


Basophils %     (0.0-0.4)  %


 


Absolute Granulocytes     (1.4-6.9)  


 


Basophils #     (0-0.4)  


 


Sodium    137  (137-145)  mmol/L


 


Potassium    3.9  (3.5-5.1)  mmol/L


 


Chloride    99  ()  mmol/L


 


Carbon Dioxide    26  (22-30)  mmol/L


 


Anion Gap    15.4 H  (5-15)  MEQ/L


 


BUN    29 H  (9-20)  mg/dL


 


Creatinine    1.81 H  (0.66-1.25)  mg/dL


 


Estimated GFR    39.9  ML/MIN


 


Glucose    114 H  ()  mg/dL


 


Lactic Acid  0.9    (0.4-2.0)  


 


Calcium    9.7  (8.4-10.2)  mg/dL


 


Total Bilirubin    0.50  (0.2-1.3)  mg/dL


 


AST    25  (17-59)  U/L


 


ALT    18  (0-50)  U/L


 


Alkaline Phosphatase    70  ()  U/L


 


Serum Total Protein    7.6  (6.3-8.2)  g/dL


 


Albumin    4.3  (3.5-5.0)  g/dL


 


Lipase    23  ()  U/L


 


Urine Color   YELLOW   (YELLOW)  


 


Urine Appearance   CLEAR   (CLEAR)  


 


Urine pH   5.0   (5-6)  


 


Ur Specific Gravity   1.015   (1.005-1.025)  


 


Urine Protein   30   (Negative)  


 


Urine Ketones   NEGATIVE   (NEGATIVE)  


 


Urine Blood   NEGATIVE   (0-5)  Jose Alberto/ul


 


Urine Nitrite   NEGATIVE   (NEGATIVE)  


 


Urine Bilirubin   NEGATIVE   (NEGATIVE)  


 


Urine Urobilinogen   NEGATIVE   (0-1)  mg/dL


 


Ur Leukocyte Esterase   NEGATIVE   (NEGATIVE)  


 


Urine WBC (Auto)   NONE   (0-5)  /HPF


 


Urine RBC (Auto)   NONE   (0-2)  /HPF


 


U Hyaline Cast (Auto)   0-2   (0-2)  /LPF


 


U Epithel Cells (Auto)   NONE   (FEW)  /HPF


 


Urine Bacteria (Auto)   NONE   (NEGATIVE)  /HPF


 


Fatty Casts   N   (NEGATIVE)  /LPF


 


Urine Mucus (Auto)   SLIGHT   (NEGATIVE)  /HPF


 


Urine Culture Reflexed   NO   (NO)  


 


Urine Glucose   >=500   (NEGATIVE)  mg/dL














  06/21/21 Range/Units





  10:10 


 


WBC  11.6 H  (4.0-10.5)  K/mm3


 


RBC  4.04 L  (4.1-5.6)  M/mm3


 


Hgb  11.8 L  (12.5-18.0)  gm/dl


 


Hct  38.6 L  (42-50)  %


 


MCV  95.5  ()  fl


 


MCH  29.2  (26-32)  pg


 


MCHC  30.6 L  (32-36)  g/dl


 


RDW  14.9 H  (11.5-14.0)  %


 


Plt Count  317  (150-450)  K/mm3


 


MPV  11.4 H  (7.5-11.0)  fl


 


Gran %  73.5 H  (36.0-66.0)  %


 


Eos # (Auto)  0.27  (0-0.5)  


 


Absolute Lymphs (auto)  1.77  (1.0-4.6)  


 


Absolute Monos (auto)  1.03  (0.0-1.3)  


 


Lymphocytes %  15.2 L  (24.0-44.0)  %


 


Monocytes %  8.8  (0.0-12.0)  %


 


Eosinophils %  2.3  (0.00-5.0)  %


 


Basophils %  0.2  (0.0-0.4)  %


 


Absolute Granulocytes  8.55 H  (1.4-6.9)  


 


Basophils #  0.02  (0-0.4)  


 


Sodium   (137-145)  mmol/L


 


Potassium   (3.5-5.1)  mmol/L


 


Chloride   ()  mmol/L


 


Carbon Dioxide   (22-30)  mmol/L


 


Anion Gap   (5-15)  MEQ/L


 


BUN   (9-20)  mg/dL


 


Creatinine   (0.66-1.25)  mg/dL


 


Estimated GFR   ML/MIN


 


Glucose   ()  mg/dL


 


Lactic Acid   (0.4-2.0)  


 


Calcium   (8.4-10.2)  mg/dL


 


Total Bilirubin   (0.2-1.3)  mg/dL


 


AST   (17-59)  U/L


 


ALT   (0-50)  U/L


 


Alkaline Phosphatase   ()  U/L


 


Serum Total Protein   (6.3-8.2)  g/dL


 


Albumin   (3.5-5.0)  g/dL


 


Lipase   ()  U/L


 


Urine Color   (YELLOW)  


 


Urine Appearance   (CLEAR)  


 


Urine pH   (5-6)  


 


Ur Specific Gravity   (1.005-1.025)  


 


Urine Protein   (Negative)  


 


Urine Ketones   (NEGATIVE)  


 


Urine Blood   (0-5)  Jose Alberto/ul


 


Urine Nitrite   (NEGATIVE)  


 


Urine Bilirubin   (NEGATIVE)  


 


Urine Urobilinogen   (0-1)  mg/dL


 


Ur Leukocyte Esterase   (NEGATIVE)  


 


Urine WBC (Auto)   (0-5)  /HPF


 


Urine RBC (Auto)   (0-2)  /HPF


 


U Hyaline Cast (Auto)   (0-2)  /LPF


 


U Epithel Cells (Auto)   (FEW)  /HPF


 


Urine Bacteria (Auto)   (NEGATIVE)  /HPF


 


Fatty Casts   (NEGATIVE)  /LPF


 


Urine Mucus (Auto)   (NEGATIVE)  /HPF


 


Urine Culture Reflexed   (NO)  


 


Urine Glucose   (NEGATIVE)  mg/dL














- Progress


Progress: improved, pain not gone completely, re-examined


Progress Note: 





06/21/21 13:48


65 years old is evaluated for right-sided abdominal pain.  He is given multiple 

doses of pain medication and on reevaluation it is better.  Work-up showed white

 count of 11 with normal lactate and chemistry profile no acute derangement in 

electrolytes, stable renal function with CKD, no UTI.  I have obtained CT abdo

men pelvis with contrast which ruled out acute appendicitis, colitis, 

obstruction, perforation, pancreatitis/cholecystitis etc.  I do not know the 

exact cause of his pain could be musculoskeletal, because of him requiring 

multiple doses of IV pain medication, I have offered him observation admission 

but patient prefers to go home and does have pain medication at home.  He is 

advised to return for worsening/intractable pain or if develop vomiting/fever 

chills etc.  Otherwise he is advised to follow-up with his primary care 

physician for reevaluation in 1 to 2 days.  Discussed signs symptoms of 

worsening needing return to ER which he seems understanding.


Counseled pt/family regarding: lab results, diagnosis, need for follow-up, rad 

results





- Departure


Departure Disposition: Home


Clinical Impression: 


 Right sided abdominal pain





Condition: Stable


Critical Care Time: No


Referrals: 


REYNA DE LA ROSA MD [Primary Care Provider] -  (1-2 days for reevaluation)


Instructions:  Acute Abdomen (Belly Pain)


Additional Instructions: 


Drink plenty of fluids.  Take pain medications which you have at home as needed.

 Follow-up with your primary care physician for reevaluation in 1 to 2 days.  

Return to ER for intractable pain or if develop intractable vomiting/fever 

chills etc.  As you might be developing early appendicitis now with normal lab 

and CT findings and if a worsening of condition may need to return to ER for 

reevaluation.

## 2021-06-21 NOTE — XRAY
Indication: Right abdomen pain.



Multiple contiguous axial images obtained through the abdomen and pelvis

without contrast.



Comparison: April 28, 2021.



Lung bases demonstrates minimal bibasilar subsegmental atelectasis/scarring.

Stable left infrahilar and left lower lobe calcified granulomas.  No

infiltrate or effusion.  Heart is not enlarged.  Again there is right

hemidiaphragm elevation.



Noncontrasted stomach and bowel loops nonobstructed.  Normal appendix.  Again

mild sigmoid diverticulosis.  No free fluid/air.  Stable fatty liver, splenic

calcified granulomas, and small left adrenal adenoma.



Remaining liver, gallbladder, pancreas, spleen, adrenal glands, kidneys,

ureters, and bladder are unremarkable for noncontrast exam.  Stable moderate

scattered aortoiliac calcification without AAA.



Osseous structures intact again with mild/moderate degenerative changes

throughout the spine.  Stable left inguinal hernia with small focus of

herniated urinary bladder.  Stable left abdominal wall cutaneous/subcutaneous

induration presumed iatrogenic.



Impression:

1.  Stable right hemidiaphragm elevation, sigmoid diverticulosis, fatty liver,

left adrenal adenoma, left inguinal hernia with small bowel herniated urinary

bladder, chronic bony findings, and old granulomatous disease.

2.  No new or acute intra-abdominal/pelvic abnormalities on this noncontrast

exam.

## 2021-06-28 ENCOUNTER — HOSPITAL ENCOUNTER (OUTPATIENT)
Dept: HOSPITAL 33 - SDC | Age: 66
Discharge: HOME | End: 2021-06-28
Attending: SURGERY
Payer: MEDICARE

## 2021-06-28 VITALS — OXYGEN SATURATION: 95 %

## 2021-06-28 VITALS — SYSTOLIC BLOOD PRESSURE: 120 MMHG | DIASTOLIC BLOOD PRESSURE: 74 MMHG | HEART RATE: 72 BPM

## 2021-06-28 DIAGNOSIS — Z79.899: ICD-10-CM

## 2021-06-28 DIAGNOSIS — K81.2: Primary | ICD-10-CM

## 2021-06-28 DIAGNOSIS — E78.5: ICD-10-CM

## 2021-06-28 DIAGNOSIS — K52.9: ICD-10-CM

## 2021-06-28 DIAGNOSIS — E11.9: ICD-10-CM

## 2021-06-28 DIAGNOSIS — I10: ICD-10-CM

## 2021-06-28 PROCEDURE — 82947 ASSAY GLUCOSE BLOOD QUANT: CPT

## 2021-06-28 NOTE — HP
DATE OF SURGERY:  06/28/2021 



HISTORY OF PRESENT ILLNESS:   The patient is a 65 year-old with epigastric pain and some 
bloating. Ultrasound showed some sludge. Given his persistent symptoms, we discussed 
options and wishes to proceed with cholecystectomy versus checking HIDA scan versus 
considering upper endoscopy for further evaluation. Given his symptoms, complaints and 
severity, he prefers to go ahead and proceed with cholecystectomy given his upper 
abdominal pain and bloating. He has had cardiology clearance from Dr. López. 



PAST MEDICAL HISTORY:  Hypertension, diabetes, hyperlipidemia, chronic back pain. 



PAST SURGICAL HISTORY:  Carpal tunnel in the past. Back surgery. Neck spine fusion in the 
past. Percutaneous transluminal angioplasty (PTA) for right popliteal stent in the past.



MEDICATIONS:  Tradjenta, Fenofibrate, cilostazol, atorvastatin for some hyperlipidemia. He 
is on ranitidine, Lyrica, aspirin and takes some Plavix. 



ALLERGIES:  CODEINE.

  

FAMILY HISTORY: Diabetes.  



SOCIAL HISTORY:  Denies smoking. He is a former smoker.  



REVIEW OF SYSTEMS:  Fourteen systems reviewed. No chest pain or palpitations. Other 
systems negative or noncontributory as above and per preadmission questionnaire. 



PHYSICAL EXAMINATION:  

GENERAL:  No acute distress.

HEENT: Sclerae nonicteric.

NECK:  No JVD.

CHEST: Equal excursion, nonlabored breathing. 

CVS:  Regular rate and rhythm. 

ABDOMEN:  Soft, some tenderness in epigastrium. No peritoneal signs.  

EXTREMITIES:  No significant edema.

NEURO:  Alert, oriented, moving extremities symmetrically. 

PSYCH: Appropriate mood and affect.  



IMPRESSION:  Persistent epigastric pain and bloating worse with eating. Question whether 
he has acute exacerbation, chronic cholecystitis and biliary sludge. Discussed options of 
cholecystectomy versus checking HIDA scan versus considering upper endoscopy first. Given 
the severity of his symptoms, he prefers to go ahead and proceed with cholecystectomy. 
Risks and benefits explained in detail including but not limited to bleeding or infection, 
risk of trocar injury or hernia, risk of bowel, bladder or blood vessel injury, risk of 
bile leak, bile duct injury, retained stone or sludge possibly requiring further procedure 
either open or ERCP, general risk of anesthesia, deep venous thrombosis, pulmonary 
embolism, pneumonia, perioperative risk of aches, pains, bloating, constipation and/or 
loose stools possibly even chronic in nature, possibility this procedure may not improve 
his symptoms. He may need further work up and/or testing, endoscopy, other studies or 
procedures. He understands and agrees to the planned procedure, will proceed with 
outpatient laparoscopic cholecystectomy with possible open.

## 2021-06-29 NOTE — OP
SURGERY DATE/TIME:   06/28/2021   4225



PREOPERATIVE DIAGNOSIS:     History of epigastric pain and bloating, gallbladder sludge, 
acute exacerbation of chronic cholecystitis, symptomatic biliary sludge, chronic 
cholecystitis. 



POSTOPERATIVE DIAGNOSIS:     History of epigastric pain and bloating, gallbladder sludge, 
acute exacerbation of chronic cholecystitis, symptomatic biliary sludge, chronic 
cholecystitis. Inflammation right colon with adjacent bowel loops unclear etiology. 

 

PROCEDURE:    Laparoscopic cholecystectomy.



SURGEON:        Dr. Torey Moeller.



ANESTHESIA:    General.



ESTIMATED BLOOD LOSS:    Minimal.



INDICATIONS:  As noted above. Risks and benefits explained in detail but not limited to 
and consent obtained.     



DESCRIPTION OF PROCEDURE AND FINDINGS:  The patient was taken to the operating room. 
General anesthesia induced. Abdomen prepped and draped in the usual sterile fashion. After 
official time out and no disagreement with planned procedure, a transverse incision made 
at the supraumbilical area. Fascia grasped and pulled upward. Veress needle inserted and 
tested with saline. Pneumoperitoneum accomplished insufflating opening pressure of 0-15. A 
5 mm bladeless port and camera inserted without difficulty followed by 11 mm epigastric 
port, two - 5 mm right upper quadrant ports. The omentum was up over the top of the liver. 
There was a small bowel and colon adhesion to the anterior abdominal wall in the right 
abdomen well away from the port areas. Continued dissection. Positioned the patient. 
Pneumoperitoneum this adhesion came down a little bit. The gallbladder is grasped, 
retracted over the edge of the liver. The gallbladder had some chronic inflammatory 
reaction. It is dissected posterior, lateral to anterior fashion. Slowly and carefully the 
cystic duct and infundibular junction slowly and carefully well skeletonized until 
critical view obtained both anteriorly and posteriorly. Once this was accomplished, cystic 
artery and cystic duct clipped x3 and divided in the usual fashion. It was quite vascular 
and required clipping additional side branches off the cystic artery directly on the wall. 
The gallbladder slowly and carefully dissected free from its dense attachment to the liver 
bed. One of the graspers tore a small pin hole in the gallbladder spilling a small amount 
of bile this is suctioned out and irrigated clear, continued dissecting the gallbladder 
clipping additional oozing side branches off the cystic artery directly on the gallbladder 
wall. Just prior to releasing from final attachments to the anterior edge of the liver, 
the liver bed re-inspected. Clips noted to be in place cystic duct and cystic artery 
stumps. No signs of any active bleeding or bile leakage. It was felt there was no benefit 
of drain placement at this point. The gallbladder had been released from final 
attachments, pulled up and out and passed off in the bag, passed off through epigastrium 
and passed off. The fascial defect closed with puncture closure device with #1 Vicryl. 
Pneumoperitoneum decompressed. The wound irrigated out. Skin incision closed with 4-0 
Vicryl. It should be noted prior to decompressing everything and prior to closing the 
fascial defect with puncture closure device with #1 Vicryl, inspected the right abdomen. 
The adhesions on the anterior abdominal wall had come down just with the pneumoperitoneum 
and no dissection done over this area. There was an inflammatory reaction almost 
ulceration from inflammation. There is no evidence of any gross leak. It was felt this 
patient needed continued antibiotics and consideration of colonoscopy in the near future 
or possibly we can get a small bowel follow through down the road. The omentum was laid 
over the top of this. Again, there was no evidence of any leaks draining at this point. He 
is to continue p.o. antibiotics when he goes home. I will get a hold of Dr. Suarez and 
inform of the findings. I will be out of town next week. If there are any questions my 
partners are there. Otherwise, he tolerated the procedure well. Pneumoperitoneum 
decompressed. The wound is irrigated out. Skin incision closed with 4-0 Vicryl. 
Steri-Strips and sterile dressing applied. 0.25% Marcaine local injected along the skin 
incision fascial defect. The patient tolerated the procedure well. There were no immediate 
complications. He did not have any family to discuss the findings with.

## 2021-07-04 ENCOUNTER — HOSPITAL ENCOUNTER (OUTPATIENT)
Dept: HOSPITAL 33 - ED | Age: 66
Setting detail: OBSERVATION
LOS: 1 days | Discharge: LEFT BEFORE DISCHARGE | End: 2021-07-05
Attending: FAMILY MEDICINE | Admitting: FAMILY MEDICINE
Payer: MEDICARE

## 2021-07-04 DIAGNOSIS — N17.9: Primary | ICD-10-CM

## 2021-07-04 DIAGNOSIS — Z79.899: ICD-10-CM

## 2021-07-04 DIAGNOSIS — G89.18: ICD-10-CM

## 2021-07-04 DIAGNOSIS — Z20.828: ICD-10-CM

## 2021-07-04 DIAGNOSIS — E11.9: ICD-10-CM

## 2021-07-04 DIAGNOSIS — R19.7: ICD-10-CM

## 2021-07-04 DIAGNOSIS — Z79.01: ICD-10-CM

## 2021-07-04 DIAGNOSIS — Z98.890: ICD-10-CM

## 2021-07-04 DIAGNOSIS — R10.11: ICD-10-CM

## 2021-07-04 DIAGNOSIS — R11.0: ICD-10-CM

## 2021-07-04 DIAGNOSIS — E78.00: ICD-10-CM

## 2021-07-04 DIAGNOSIS — I10: ICD-10-CM

## 2021-07-04 DIAGNOSIS — R10.84: ICD-10-CM

## 2021-07-04 DIAGNOSIS — E86.0: ICD-10-CM

## 2021-07-04 LAB
ALBUMIN SERPL-MCNC: 4.2 G/DL (ref 3.5–5)
ALP SERPL-CCNC: 79 U/L (ref 38–126)
ALT SERPL-CCNC: 16 U/L (ref 0–50)
AMYLASE SERPL-CCNC: 54 U/L (ref 30–110)
ANION GAP SERPL CALC-SCNC: 18 MEQ/L (ref 5–15)
AST SERPL QL: 23 U/L (ref 17–59)
BASOPHILS # BLD AUTO: 0.03 10*3/UL (ref 0–0.4)
BASOPHILS NFR BLD AUTO: 0.2 % (ref 0–0.4)
BILIRUB BLD-MCNC: 0.5 MG/DL (ref 0.2–1.3)
BUN SERPL-MCNC: 42 MG/DL (ref 9–20)
CALCIUM SPEC-MCNC: 10 MG/DL (ref 8.4–10.2)
CHLORIDE SERPL-SCNC: 95 MMOL/L (ref 98–107)
CO2 SERPL-SCNC: 24 MMOL/L (ref 22–30)
CREAT SERPL-MCNC: 2.41 MG/DL (ref 0.66–1.25)
EOSINOPHIL # BLD AUTO: 0.21 10*3/UL (ref 0–0.5)
GFR SERPLBLD BASED ON 1.73 SQ M-ARVRAT: 28.7 ML/MIN
GLUCOSE SERPL-MCNC: 160 MG/DL (ref 74–106)
GLUCOSE UR-MCNC: >=500 MG/DL
HCT VFR BLD AUTO: 41.4 % (ref 42–50)
HGB BLD-MCNC: 13.1 GM/DL (ref 12.5–18)
LIPASE SERPL-CCNC: 53 U/L (ref 23–300)
LYMPHOCYTES # SPEC AUTO: 2.68 10*3/UL (ref 1–4.6)
MCH RBC QN AUTO: 29.4 PG (ref 26–32)
MCHC RBC AUTO-ENTMCNC: 31.6 G/DL (ref 32–36)
MONOCYTES # BLD AUTO: 0.95 10*3/UL (ref 0–1.3)
PLATELET # BLD AUTO: 535 K/MM3 (ref 150–450)
POTASSIUM SERPLBLD-SCNC: 4.5 MMOL/L (ref 3.5–5.1)
PROT SERPL-MCNC: 7.7 G/DL (ref 6.3–8.2)
PROT UR STRIP-MCNC: NEGATIVE MG/DL
RBC # BLD AUTO: 4.46 M/MM3 (ref 4.1–5.6)
RBC #/AREA URNS HPF: (no result) /HPF (ref 0–2)
SODIUM SERPL-SCNC: 132 MMOL/L (ref 137–145)
WBC # BLD AUTO: 14.4 K/MM3 (ref 4–10.5)
WBC #/AREA URNS HPF: (no result) /HPF (ref 0–5)

## 2021-07-04 PROCEDURE — 84484 ASSAY OF TROPONIN QUANT: CPT

## 2021-07-04 PROCEDURE — 99285 EMERGENCY DEPT VISIT HI MDM: CPT

## 2021-07-04 PROCEDURE — 85025 COMPLETE CBC W/AUTO DIFF WBC: CPT

## 2021-07-04 PROCEDURE — 83036 HEMOGLOBIN GLYCOSYLATED A1C: CPT

## 2021-07-04 PROCEDURE — 96360 HYDRATION IV INFUSION INIT: CPT

## 2021-07-04 PROCEDURE — U0003 INFECTIOUS AGENT DETECTION BY NUCLEIC ACID (DNA OR RNA); SEVERE ACUTE RESPIRATORY SYNDROME CORONAVIRUS 2 (SARS-COV-2) (CORONAVIRUS DISEASE [COVID-19]), AMPLIFIED PROBE TECHNIQUE, MAKING USE OF HIGH THROUGHPUT TECHNOLOGIES AS DESCRIBED BY CMS-2020-01-R: HCPCS

## 2021-07-04 PROCEDURE — 96375 TX/PRO/DX INJ NEW DRUG ADDON: CPT

## 2021-07-04 PROCEDURE — G0378 HOSPITAL OBSERVATION PER HR: HCPCS

## 2021-07-04 PROCEDURE — 94762 N-INVAS EAR/PLS OXIMTRY CONT: CPT

## 2021-07-04 PROCEDURE — 81001 URINALYSIS AUTO W/SCOPE: CPT

## 2021-07-04 PROCEDURE — 36000 PLACE NEEDLE IN VEIN: CPT

## 2021-07-04 PROCEDURE — 74176 CT ABD & PELVIS W/O CONTRAST: CPT

## 2021-07-04 PROCEDURE — 83605 ASSAY OF LACTIC ACID: CPT

## 2021-07-04 PROCEDURE — 96376 TX/PRO/DX INJ SAME DRUG ADON: CPT

## 2021-07-04 PROCEDURE — 93268 ECG RECORD/REVIEW: CPT

## 2021-07-04 PROCEDURE — 36415 COLL VENOUS BLD VENIPUNCTURE: CPT

## 2021-07-04 PROCEDURE — 82947 ASSAY GLUCOSE BLOOD QUANT: CPT

## 2021-07-04 PROCEDURE — 96374 THER/PROPH/DIAG INJ IV PUSH: CPT

## 2021-07-04 PROCEDURE — 93005 ELECTROCARDIOGRAM TRACING: CPT

## 2021-07-04 PROCEDURE — 80053 COMPREHEN METABOLIC PANEL: CPT

## 2021-07-04 PROCEDURE — 82150 ASSAY OF AMYLASE: CPT

## 2021-07-04 PROCEDURE — 83690 ASSAY OF LIPASE: CPT

## 2021-07-04 RX ADMIN — HYDROMORPHONE HYDROCHLORIDE PRN ML: 2 INJECTION INTRAMUSCULAR; INTRAVENOUS; SUBCUTANEOUS at 23:59

## 2021-07-04 RX ADMIN — CEFEPIME HYDROCHLORIDE SCH MLS/HR: 2 INJECTION, POWDER, FOR SOLUTION INTRAVENOUS at 22:52

## 2021-07-04 NOTE — XRAY
Indication: Right upper quadrant pain.  Status post cholecystectomy.



Multiple contiguous axial images obtained through the abdomen and pelvis

without contrast.



Comparison: June 21, 2021



Lung bases clear of infiltrate or effusion.  Heart not enlarged.  Again there

is right hemidiaphragm elevation.



Noncontrasted stomach and bowel loops remain nonobstructed.  Normal appendix.

Stable mild sigmoid diverticulosis.  Interval cholecystectomy.  No free

fluid/air.  Stable fatty liver, splenic aspect granulomas, and small left

adrenal adenoma.



Remaining liver, pancreas, spleen, adrenal glands, kidneys, ureters, and

bladder are unremarkable for noncontrast exam.  Stable moderate scattered

aortoiliac calcifications without AAA.



Osseous structures intact again with degenerative changes throughout the

spine.  Stable small fatty left inguinal hernia.



Impression:

1.  Interval cholecystectomy.  No complications.

2.  Again incidental right hemidiaphragm elevation, sigmoid diverticulosis,

fatty liver, left adrenal adenoma, fatty left inguinal hernia, and chronic

bony findings



Comment: Preliminary interpretation was made by [facility].  No critical discrepancy.

## 2021-07-04 NOTE — ERPHSYRPT
- History of Present Illness


Time Seen by Provider: 07/04/21 14:05


Historian: patient, family


Exam Limitations: no limitations


Patient Subjective Stated Complaint: Pt states that he had his gall bladder 

removed here on Monday and continues to have abdominal pain at his right 

quadrants


Triage Nursing Assessment: Pt was brought to the ER by a friend, vitals liya, 

rates pain in the right quadrants as 8/10, did not call the surgeon, pulses 

normal, skin n/w/d, appears to be in significant pain


Physician History: 





pt had GB surgery last week, but has increasing abd pain and nausea past few 

days. tender general abd now on exam . 


Timing/Duration: day(s)


Activities at Onset: none


Quality: fullness, pressure, sharpness


Abdominal Pain Onset Location: RUQ, generalized abdomen


Pain Radiation: RUQ


Severity of Pain-Max: moderate


Severity of Pain-Current: moderate


Modifying Factors: Improves With: nothing


Associated Symptoms: loss of appetite, nausea


Previous symptoms: same symptoms as today


Allergies/Adverse Reactions: 








codeine [Codeine] Adverse Reaction (Intermediate, Verified 07/04/21 13:57)


   Nausea and Vomiting





Home Medications: 








Fenofibrate Nanocrystallized [Fenofibrate] 145 mg PO DAILY 10/19/17 [History]


Pregabalin [Lyrica 100Mg***] 100 mg PO TID 10/19/17 [History]


Clopidogrel Bisulfate 75 mg*** [PLAVIX 75 MG Tablet***] 75 mg PO DAILY 06/17/21 

[History]


Empagliflozin [Jardiance] 10 mg PO DAILY 06/17/21 [History]


Insulin Detemir [Levemir] 75 units SQ BID 06/17/21 [History]


Lisinopril/Hydrochlorothiazide [Lisinopril-Hctz 10-12.5 mg Tab] 1 tab PO DAILY 

06/17/21 [History]


PANTOPRAZOLE 40 mg Tablet*** [Protonix 40MG Tablet***] 1 tab PO DAILY 06/17/21 

[History]


Aspirin 81 gm Chew*** [Baby Aspirin 81 mg Chew***] 81 mg PO DAILY 06/21/21 

[History]





Hx Tetanus, Diphtheria Vaccination/Date Given: No


Hx Influenza Vaccination/Date Given: Yes (2012)


Hx Pneumococcal Vaccination/Date Given: Yes (2012)





Travel Risk





- International Travel


Have you traveled outside of the country in past 3 weeks: No





- Coronavirus Screening


Are you exhibiting any of the following symptoms?: No


Close contact with a COVID-19 positive Pt in past 14-21 Days: No





- Vaccine Status


Have you recieved a Covid-19 vaccination: Yes


: [employer]





- Review of Systems


Constitutional: Fever, No Chills


Eyes: No Symptoms


Ears, Nose, & Throat: No Symptoms


Respiratory: No Cough, No Dyspnea


Cardiac: No Chest Pain, No Edema, No Syncope


Abdominal/Gastrointestinal: Abdominal Pain, Nausea, Vomiting, No Diarrhea


Genitourinary Symptoms: No Dysuria


Musculoskeletal: No Back Pain, No Neck Pain


Skin: No Symptoms, No Rash


Neurological: No Dizziness, No Focal Weakness, No Sensory Changes


Psychological: No Symptoms


Endocrine: No Symptoms


All Other Systems: Reviewed and Negative





- Past Medical History


Pertinent Past Medical History: Yes (.)


Neurological History: No Pertinent History


ENT History: No Pertinent History


Cardiac History: High Cholesterol, Hypertension


Respiratory History: No Pertinent History


Endocrine Medical History: Diabetes Type II


Musculoskeletal History: Other


GI Medical History: GERD, Gallbladder Disease


 History: No Pertinent History


Psycho-Social History: No Pertinent History


Male Reproductive Disorders: No Pertinent History





- Past Surgical History


Past Surgical History: Yes


Neuro Surgical History: No Pertinent History


Cardiac: Vascular Surgery


Respiratory: No Pertinent History


Gastrointestinal: Cholecystectomy


Genitourinary: No Pertinent History


Musculoskeletal: Orthopedic Surgery


Male Surgical History: No Pertinent History


Other Surgical History: BACK ,NECK AND ELBOW SURG,LEFT SHOULDER. vascular 

surgery to right knee(stent placed in right knee)





- Social History


Smoking Status: Former smoker


How long have you smoked: 10/2020


Exposure to second hand smoke: No


Alcohol Use: None


Drug Use: none


Patient Lives Alone: Yes


Significant Family History: no pertinent family hx





- Nursing Vital Signs


Nursing Vital Signs: 


                               Initial Vital Signs











Temperature  97.1 F   07/04/21 13:50


 


Pulse Rate  99 H  07/04/21 13:50


 


Blood Pressure  137/84   07/04/21 13:50


 


O2 Sat by Pulse Oximetry  96   07/04/21 13:50








                                   Pain Scale











Pain Intensity                 7

















- Physical Exam


General Appearance: no apparent distress, alert


Eye Exam: PERRL/EOMI, eyes nml inspection


Ears, Nose, Throat Exam: normal ENT inspection, pharynx normal, moist mucous 

membranes


Neck Exam: normal inspection, non-tender, supple, full range of motion


Respiratory Exam: normal breath sounds, lungs clear, No respiratory distress


Cardiovascular Exam: regular rate/rhythm, normal heart sounds


Gastrointestinal/Abdomen Exam: soft, tenderness, guarding, No mass, No pulsatile

 mass


Rectal Exam: deferred


Back Exam: normal inspection, normal range of motion, No CVA tenderness, No ve

rtebral tenderness


Extremity Exam: normal inspection, normal range of motion, pelvis stable


Neurologic Exam: alert, oriented x 3, cooperative, normal mood/affect, nml 

cerebellar function, sensation nml, No motor deficits


Skin Exam: normal color, warm, dry


SpO2: 96





- Course


Nursing assessment & vital signs reviewed: Yes





- CT Exams


  ** Abdomen/Pelvis


CT Interpretation: Tele-radiologist Report, Other (no acute findings per rad)


Ordered Tests: 


                               Active Orders 24 hr











 Category Date Time Status


 


 EKG-ER Only STAT Care  07/04/21 14:08 Active


 


 IV Insertion STAT Care  07/04/21 14:08 Active


 


 ABDOMEN AND PELVIS W/0 CONTRAS [CT] Stat Exams  07/04/21 14:09 Completed


 


 AMYLASE Stat Lab  07/04/21 14:15 Completed


 


 CBC W DIFF Stat Lab  07/04/21 14:15 Completed


 


 CMP Stat Lab  07/04/21 14:15 Completed


 


 LIPASE Stat Lab  07/04/21 14:15 Completed


 


 Lactic Acid Stat Lab  07/04/21 14:08 Completed


 


 TROPONIN Q3H Lab  07/04/21 14:15 Completed


 


 TROPONIN Q3H Lab  07/04/21 17:37 Completed


 


 TROPONIN Q3H Lab  07/05/21 02:15 Ordered


 


 UA W/RFX UR CULTURE Stat Lab  07/04/21 15:36 Completed








Medication Summary














Discontinued Medications














Generic Name Dose Route Start Last Admin





  Trade Name Kennyq  PRN Reason Stop Dose Admin


 


Famotidine  20 mg  07/04/21 14:08  07/04/21 14:20





  Pepcid 20 Mg Vial***  IV  07/04/21 14:09  20 mg





  STAT ONE   Administration


 


Famotidine  Confirm  07/04/21 14:16 





  Pepcid 20 Mg Vial***  Administered  07/04/21 14:17 





  Dose  





  20 mg  





  IV  





  .STK-MED ONE  


 


Hydromorphone HCl  1 mg  07/04/21 14:08  07/04/21 14:20





  Hydromorphone 1 Mg/Ml Injection***  IV  07/04/21 14:09  1 mg





  STAT ONE   Administration


 


Hydromorphone HCl  Confirm  07/04/21 14:16 





  Hydromorphone 1 Mg/Ml Injection***  Administered  07/04/21 14:17 





  Dose  





  1 mg  





  .ROUTE  





  .STK-MED ONE  


 


Hydromorphone HCl  1 mg  07/04/21 17:06  07/04/21 17:14





  Hydromorphone 1 Mg/Ml Injection***  IV  07/04/21 17:07  1 mg





  STAT ONE   Administration


 


Hydromorphone HCl  Confirm  07/04/21 17:11 





  Hydromorphone 1 Mg/Ml Injection***  Administered  07/04/21 17:12 





  Dose  





  1 mg  





  .ROUTE  





  .STK-MED ONE  


 


Hydromorphone HCl  1 mg  07/04/21 19:45  07/04/21 19:52





  Hydromorphone 1 Mg/Ml Injection***  IV  07/04/21 19:46  1 mg





  STAT ONE   Administration


 


Hydromorphone HCl  Confirm  07/04/21 19:50 





  Hydromorphone 1 Mg/Ml Injection***  Administered  07/04/21 19:51 





  Dose  





  1 mg  





  .ROUTE  





  .STK-MED ONE  


 


Sodium Chloride  1,000 mls @ 999 mls/hr  07/04/21 14:08  07/04/21 15:24





  Sodium Chloride 0.9% 1000 Ml  IV  07/04/21 15:08  Infused





  .Q1H1M STA   Infusion


 


Sodium Chloride  Confirm  07/04/21 14:16 





  Sodium Chloride 0.9% 1000 Ml  Administered  07/04/21 14:17 





  Dose  





  1,000 mls @ ud  





  .ROUTE  





  .STK-MED ONE  


 


Sodium Chloride  1,000 mls @ 999 mls/hr  07/04/21 17:12  07/04/21 17:14





  Sodium Chloride 0.9% 1000 Ml  IV  07/04/21 18:12  999 mls/hr





  .Q1H1M STA   Administration


 


Sodium Chloride  Confirm  07/04/21 17:11 





  Sodium Chloride 0.9% 1000 Ml  Administered  07/04/21 17:12 





  Dose  





  1,000 mls @ ud  





  .ROUTE  





  .STK-MED ONE  


 


Ondansetron HCl  4 mg  07/04/21 14:08  07/04/21 14:19





  Zofran 4 Mg/2 Ml Vial**  IV  07/04/21 14:09  4 mg





  STAT ONE   Administration


 


Ondansetron HCl  Confirm  07/04/21 14:16 





  Zofran 4 Mg/2 Ml Vial**  Administered  07/04/21 14:17 





  Dose  





  4 mg  





  .ROUTE  





  .STK-MED ONE  


 


Pantoprazole Sodium  40 mg  07/04/21 14:08  07/04/21 14:20





  Protonix 40 Mg Iv***  IV  07/04/21 14:09  40 mg





  STAT ONE   Administration


 


Pantoprazole Sodium  Confirm  07/04/21 14:16 





  Protonix 40 Mg Iv***  Administered  07/04/21 14:17 





  Dose  





  40 mg  





  IV  





  .STK-MED ONE  











Lab/Rad Data: 


                           Laboratory Result Diagrams





                                 07/04/21 14:15 





                                 07/04/21 14:15 





                               Laboratory Results











  07/04/21 07/04/21 07/04/21 Range/Units





  17:37 15:36 14:15 


 


WBC     (4.0-10.5)  K/mm3


 


RBC     (4.1-5.6)  M/mm3


 


Hgb     (12.5-18.0)  gm/dl


 


Hct     (42-50)  %


 


MCV     ()  fl


 


MCH     (26-32)  pg


 


MCHC     (32-36)  g/dl


 


RDW     (11.5-14.0)  %


 


Plt Count     (150-450)  K/mm3


 


MPV     (7.5-11.0)  fl


 


Gran %     (36.0-66.0)  %


 


Eos # (Auto)     (0-0.5)  


 


Absolute Lymphs (auto)     (1.0-4.6)  


 


Absolute Monos (auto)     (0.0-1.3)  


 


Lymphocytes %     (24.0-44.0)  %


 


Monocytes %     (0.0-12.0)  %


 


Eosinophils %     (0.00-5.0)  %


 


Basophils %     (0.0-0.4)  %


 


Absolute Granulocytes     (1.4-6.9)  


 


Basophils #     (0-0.4)  


 


Sodium     (137-145)  mmol/L


 


Potassium     (3.5-5.1)  mmol/L


 


Chloride     ()  mmol/L


 


Carbon Dioxide     (22-30)  mmol/L


 


Anion Gap     (5-15)  MEQ/L


 


BUN     (9-20)  mg/dL


 


Creatinine     (0.66-1.25)  mg/dL


 


Estimated GFR     ML/MIN


 


Glucose     ()  mg/dL


 


Lactic Acid     (0.4-2.0)  


 


Calcium     (8.4-10.2)  mg/dL


 


Total Bilirubin     (0.2-1.3)  mg/dL


 


AST     (17-59)  U/L


 


ALT     (0-50)  U/L


 


Alkaline Phosphatase     ()  U/L


 


Troponin I  < 0.012   < 0.012  (0.000-0.034)  ng/mL


 


Serum Total Protein     (6.3-8.2)  g/dL


 


Albumin     (3.5-5.0)  g/dL


 


Amylase     ()  U/L


 


Lipase     ()  U/L


 


Urine Color   YELLOW   (YELLOW)  


 


Urine Appearance   CLEAR   (CLEAR)  


 


Urine pH   5.0   (5-6)  


 


Ur Specific Gravity   1.023   (1.005-1.025)  


 


Urine Protein   NEGATIVE   (Negative)  


 


Urine Ketones   NEGATIVE   (NEGATIVE)  


 


Urine Blood   NEGATIVE   (0-5)  Jose Alberto/ul


 


Urine Nitrite   NEGATIVE   (NEGATIVE)  


 


Urine Bilirubin   NEGATIVE   (NEGATIVE)  


 


Urine Urobilinogen   NEGATIVE   (0-1)  mg/dL


 


Ur Leukocyte Esterase   NEGATIVE   (NEGATIVE)  


 


Urine WBC (Auto)   NONE   (0-5)  /HPF


 


Urine RBC (Auto)   NONE   (0-2)  /HPF


 


U Epithel Cells (Auto)   NONE   (FEW)  /HPF


 


Urine Bacteria (Auto)   NONE   (NEGATIVE)  /HPF


 


Urine Mucus (Auto)   SLIGHT   (NEGATIVE)  /HPF


 


Urine Culture Reflexed   NO   (NO)  


 


Urine Glucose   >=500   (NEGATIVE)  mg/dL














  07/04/21 07/04/21 07/04/21 Range/Units





  14:15 14:15 14:08 


 


WBC   14.4 H   (4.0-10.5)  K/mm3


 


RBC   4.46   (4.1-5.6)  M/mm3


 


Hgb   13.1   (12.5-18.0)  gm/dl


 


Hct   41.4 L   (42-50)  %


 


MCV   92.8   ()  fl


 


MCH   29.4   (26-32)  pg


 


MCHC   31.6 L   (32-36)  g/dl


 


RDW   14.4 H   (11.5-14.0)  %


 


Plt Count   535 H   (150-450)  K/mm3


 


MPV   11.0   (7.5-11.0)  fl


 


Gran %   73.1 H   (36.0-66.0)  %


 


Eos # (Auto)   0.21   (0-0.5)  


 


Absolute Lymphs (auto)   2.68   (1.0-4.6)  


 


Absolute Monos (auto)   0.95   (0.0-1.3)  


 


Lymphocytes %   18.6 L   (24.0-44.0)  %


 


Monocytes %   6.6   (0.0-12.0)  %


 


Eosinophils %   1.5   (0.00-5.0)  %


 


Basophils %   0.2   (0.0-0.4)  %


 


Absolute Granulocytes   10.50 H   (1.4-6.9)  


 


Basophils #   0.03   (0-0.4)  


 


Sodium  132 L    (137-145)  mmol/L


 


Potassium  4.5    (3.5-5.1)  mmol/L


 


Chloride  95 L    ()  mmol/L


 


Carbon Dioxide  24    (22-30)  mmol/L


 


Anion Gap  18.0 H    (5-15)  MEQ/L


 


BUN  42 H    (9-20)  mg/dL


 


Creatinine  2.41 H    (0.66-1.25)  mg/dL


 


Estimated GFR  28.7    ML/MIN


 


Glucose  160 H    ()  mg/dL


 


Lactic Acid    1.8  (0.4-2.0)  


 


Calcium  10.0    (8.4-10.2)  mg/dL


 


Total Bilirubin  0.50    (0.2-1.3)  mg/dL


 


AST  23    (17-59)  U/L


 


ALT  16    (0-50)  U/L


 


Alkaline Phosphatase  79    ()  U/L


 


Troponin I     (0.000-0.034)  ng/mL


 


Serum Total Protein  7.7    (6.3-8.2)  g/dL


 


Albumin  4.2    (3.5-5.0)  g/dL


 


Amylase  54    ()  U/L


 


Lipase  53    ()  U/L


 


Urine Color     (YELLOW)  


 


Urine Appearance     (CLEAR)  


 


Urine pH     (5-6)  


 


Ur Specific Gravity     (1.005-1.025)  


 


Urine Protein     (Negative)  


 


Urine Ketones     (NEGATIVE)  


 


Urine Blood     (0-5)  Jose Alberto/ul


 


Urine Nitrite     (NEGATIVE)  


 


Urine Bilirubin     (NEGATIVE)  


 


Urine Urobilinogen     (0-1)  mg/dL


 


Ur Leukocyte Esterase     (NEGATIVE)  


 


Urine WBC (Auto)     (0-5)  /HPF


 


Urine RBC (Auto)     (0-2)  /HPF


 


U Epithel Cells (Auto)     (FEW)  /HPF


 


Urine Bacteria (Auto)     (NEGATIVE)  /HPF


 


Urine Mucus (Auto)     (NEGATIVE)  /HPF


 


Urine Culture Reflexed     (NO)  


 


Urine Glucose     (NEGATIVE)  mg/dL














- Progress


Progress: improved, re-examined


Progress Note: 





07/04/21 19:47


awaiting page to Dr. warren  to consider admit for continuing abd pain and 

unable to marvel PO. 


07/04/21 20:58


discussed with Dr. Warren and pt and all agree he has failed trial for outpt 

in ER today and need pain relief in hosp and obs , clear liquids consult for 

leuking in am.  


Discussed with .: Live


Will see patient in: hospital (observation)


Counseled pt/family regarding: lab results, diagnosis, need for follow-up, rad 

results





- Departure


Departure Disposition: Observation


Clinical Impression: 


 intractable abd pain SP sergio poor intak





Condition: Good


Critical Care Time: No


Referrals: 


REYNA DE LA ROSA MD [Primary Care Provider] -

## 2021-07-05 VITALS — OXYGEN SATURATION: 97 % | DIASTOLIC BLOOD PRESSURE: 70 MMHG | HEART RATE: 62 BPM | SYSTOLIC BLOOD PRESSURE: 140 MMHG

## 2021-07-05 LAB
ALBUMIN SERPL-MCNC: 3.7 G/DL (ref 3.5–5)
ALP SERPL-CCNC: 72 U/L (ref 38–126)
ALT SERPL-CCNC: 15 U/L (ref 0–50)
ANION GAP SERPL CALC-SCNC: 15.2 MEQ/L (ref 5–15)
AST SERPL QL: 30 U/L (ref 17–59)
BASOPHILS # BLD AUTO: 0.06 10*3/UL (ref 0–0.4)
BASOPHILS NFR BLD AUTO: 0.5 % (ref 0–0.4)
BILIRUB BLD-MCNC: 0.3 MG/DL (ref 0.2–1.3)
BUN SERPL-MCNC: 39 MG/DL (ref 9–20)
CALCIUM SPEC-MCNC: 9.2 MG/DL (ref 8.4–10.2)
CHLORIDE SERPL-SCNC: 98 MMOL/L (ref 98–107)
CO2 SERPL-SCNC: 23 MMOL/L (ref 22–30)
CREAT SERPL-MCNC: 2.18 MG/DL (ref 0.66–1.25)
EOSINOPHIL # BLD AUTO: 0.39 10*3/UL (ref 0–0.5)
GFR SERPLBLD BASED ON 1.73 SQ M-ARVRAT: 32.2 ML/MIN
GLUCOSE SERPL-MCNC: 145 MG/DL (ref 74–106)
HCT VFR BLD AUTO: 39.7 % (ref 42–50)
HGB BLD-MCNC: 12.2 GM/DL (ref 12.5–18)
LYMPHOCYTES # SPEC AUTO: 3.97 10*3/UL (ref 1–4.6)
MCH RBC QN AUTO: 29.5 PG (ref 26–32)
MCHC RBC AUTO-ENTMCNC: 30.7 G/DL (ref 32–36)
MONOCYTES # BLD AUTO: 1.16 10*3/UL (ref 0–1.3)
PLATELET # BLD AUTO: 478 K/MM3 (ref 150–450)
POTASSIUM SERPLBLD-SCNC: 4.1 MMOL/L (ref 3.5–5.1)
PROT SERPL-MCNC: 6.8 G/DL (ref 6.3–8.2)
RBC # BLD AUTO: 4.14 M/MM3 (ref 4.1–5.6)
SODIUM SERPL-SCNC: 132 MMOL/L (ref 137–145)
WBC # BLD AUTO: 13.3 K/MM3 (ref 4–10.5)

## 2021-07-05 RX ADMIN — METRONIDAZOLE SCH MLS/HR: 500 INJECTION, SOLUTION INTRAVENOUS at 00:00

## 2021-07-05 RX ADMIN — METRONIDAZOLE SCH MLS/HR: 500 INJECTION, SOLUTION INTRAVENOUS at 11:59

## 2021-07-05 RX ADMIN — METRONIDAZOLE SCH MLS/HR: 500 INJECTION, SOLUTION INTRAVENOUS at 06:01

## 2021-07-05 RX ADMIN — CEFEPIME HYDROCHLORIDE SCH MLS/HR: 2 INJECTION, POWDER, FOR SOLUTION INTRAVENOUS at 06:01

## 2021-07-05 RX ADMIN — HYDROMORPHONE HYDROCHLORIDE PRN ML: 2 INJECTION INTRAMUSCULAR; INTRAVENOUS; SUBCUTANEOUS at 09:11

## 2021-07-05 NOTE — PCM.HP
History of Present Illness





- Chief Complaint


Chief Complaint: intract abd pain SP sergio unable to marvel diet


History of Present Illness: 


 is a 65 year old male who had a laparoscopic cholecystectomy 7 days 

ago, for the first few days he was doing well but pain was worsening and his po 

intake has been poor. no vomiting but has some nausea, has had loose stools that

initially were foul smelling but now are just loose, he has improved with PCA 

and IV fluids and is feeling some better today.








- Review of Systems


Constitutional: No Fever, No Chills


Eyes: No Symptoms


Abdominal/Gastrointestinal: Abdominal Pain, Nausea, Diarrhea, No Vomiting


Genitourinary Symptoms: No Dysuria


Skin: No Rash


All Other Systems: Reviewed and Negative





Medications & Allergies


Home Medications: 


                              Home Medication List





Fenofibrate Nanocrystallized [Fenofibrate] 145 mg PO DAILY 10/19/17 [History 

Confirmed 07/04/21]


Pregabalin [Lyrica 100Mg***] 100 mg PO TID 10/19/17 [History Confirmed 07/04/21]


Cilostazol 100 mg*** [Pletal 100 MG***] 100 mg PO BID #60 tablet 10/21/17 [Rx 

Confirmed 07/04/21]


Clopidogrel Bisulfate 75 mg*** [PLAVIX 75 MG Tablet***] 75 mg PO DAILY 06/17/21 

[History Confirmed 07/04/21]


Empagliflozin [Jardiance] 10 mg PO DAILY 06/17/21 [History Confirmed 07/04/21]


Insulin Detemir [Levemir] 75 units SQ BID 06/17/21 [History Confirmed 07/04/21]


Lisinopril/Hydrochlorothiazide [Lisinopril-Hctz 10-12.5 mg Tab] 1 tab PO DAILY 

06/17/21 [History Confirmed 07/04/21]


PANTOPRAZOLE 40 mg Tablet*** [Protonix 40MG Tablet***] 1 tab PO DAILY 06/17/21 

[History Confirmed 07/04/21]


Aspirin 81 gm Chew*** [Baby Aspirin 81 mg Chew***] 81 mg PO DAILY 06/21/21 

[History Confirmed 07/04/21]


Ciprofloxacin [Cipro 500 MG***] 500 mg PO BID 07/04/21 [History Confirmed 

07/04/21]


Metronidazole 500 mg*** [Flagyl 500 MG***] 500 mg PO TID 07/04/21 [History 

Confirmed 07/04/21]








Allergies/Adverse Reactions: 


                                    Allergies











Allergy/AdvReac Type Severity Reaction Status Date / Time


 


codeine [Codeine] AdvReac Intermediate Nausea and Verified 07/04/21 22:50





   Vomiting  














- Past Medical History


Past Medical History: Yes (.)


Neurological History: No Pertinent History


ENT History: No Pertinent History


Cardiac History: High Cholesterol, Hypertension


Respiratory History: No Pertinent History


Endocrine Medical History: Diabetes Type II


Musculoskelatal History: Other


GI Medical History: GERD, Gallbladder Disease


 History: No Pertinent History


Pyscho-Social History: No Pertinent History


Male Reproductive Disorders: No Pertinent History





- Past Surgical History


Past Surgical History: Yes


Neuro Surgical History: No Pertinent History


Cardiac History: Vascular Surgery


Respiratory Surgery: No Pertinent History


GI Surgical History: Cholecystectomy


Genitourinary Surgical Hx: No Pertinent History


Musculskeletal Surgical Hx: Orthopedic Surgery


Male Surgical History: No Pertinent History


Other Surgical History: BACK ,NECK AND ELBOW SURG,LEFT SHOULDER. vascular 

surgery to right knee(stent placed in right knee)





- Social History


Smoking Status: Former smoker


How long have you smoked: 25 years


Exposure to second hand smoke: No


Alcohol: None


Drug Use: none


Significant Family History: no pertinent family hx





- Physical Exam


Vital Signs: 


                               Vital Signs - 24 hr











  Temp Pulse Resp BP Pulse Ox


 


 07/05/21 07:23  98.1 F  75  18  133/70  98


 


 07/05/21 06:05      99


 


 07/05/21 04:00  98.1 F  67  17  119/74  96


 


 07/05/21 03:59      96


 


 07/04/21 23:59      96


 


 07/04/21 23:00      96


 


 07/04/21 22:51  97.9 F  86  20  150/72  96


 


 07/04/21 22:23   83  18  122/71  96


 


 07/04/21 21:00      96


 


 07/04/21 21:00   93 H   104/56  92 L


 


 07/04/21 20:00   80   119/74  94 L


 


 07/04/21 18:21   79  16  121/68  94 L


 


 07/04/21 15:26   75   114/70  95


 


 07/04/21 13:50  97.1 F  99 H   137/84  96











General Appearance: no apparent distress, alert


Respiratory Exam: normal breath sounds, lungs clear, No respiratory distress


Cardiovascular Exam: regular rate/rhythm, normal heart sounds, normal peripheral

 pulses


Gastrointestinal/Abdomen Exam: soft, normal bowel sounds, other (port sites 

intact, well approximated. no redness or drainage.), No guarding, No rebound


Extremity Exam: normal inspection, normal range of motion, pelvis stable


Skin Exam: normal color, warm, dry, No rash





Results





- Labs


Lab/Micro Results: 


                            Lab Results-Last 24 Hours











  07/04/21 07/04/21 07/04/21 Range/Units





  14:08 14:15 14:15 


 


WBC   14.4 H   (4.0-10.5)  K/mm3


 


RBC   4.46   (4.1-5.6)  M/mm3


 


Hgb   13.1   (12.5-18.0)  gm/dl


 


Hct   41.4 L   (42-50)  %


 


MCV   92.8   ()  fl


 


MCH   29.4   (26-32)  pg


 


MCHC   31.6 L   (32-36)  g/dl


 


RDW   14.4 H   (11.5-14.0)  %


 


Plt Count   535 H   (150-450)  K/mm3


 


MPV   11.0   (7.5-11.0)  fl


 


Gran %   73.1 H   (36.0-66.0)  %


 


Eos # (Auto)   0.21   (0-0.5)  


 


Absolute Lymphs (auto)   2.68   (1.0-4.6)  


 


Absolute Monos (auto)   0.95   (0.0-1.3)  


 


Lymphocytes %   18.6 L   (24.0-44.0)  %


 


Monocytes %   6.6   (0.0-12.0)  %


 


Eosinophils %   1.5   (0.00-5.0)  %


 


Basophils %   0.2   (0.0-0.4)  %


 


Absolute Granulocytes   10.50 H   (1.4-6.9)  


 


Basophils #   0.03   (0-0.4)  


 


Sodium    132 L  (137-145)  mmol/L


 


Potassium    4.5  (3.5-5.1)  mmol/L


 


Chloride    95 L  ()  mmol/L


 


Carbon Dioxide    24  (22-30)  mmol/L


 


Anion Gap    18.0 H  (5-15)  MEQ/L


 


BUN    42 H  (9-20)  mg/dL


 


Creatinine    2.41 H  (0.66-1.25)  mg/dL


 


Estimated GFR    28.7  ML/MIN


 


Glucose    160 H  ()  mg/dL


 


POC Glucometer     (74 to 106)  mg/dL


 


Lactic Acid  1.8    (0.4-2.0)  


 


Calcium    10.0  (8.4-10.2)  mg/dL


 


Total Bilirubin    0.50  (0.2-1.3)  mg/dL


 


AST    23  (17-59)  U/L


 


ALT    16  (0-50)  U/L


 


Alkaline Phosphatase    79  ()  U/L


 


Troponin I     (0.000-0.034)  ng/mL


 


Serum Total Protein    7.7  (6.3-8.2)  g/dL


 


Albumin    4.2  (3.5-5.0)  g/dL


 


Amylase    54  ()  U/L


 


Lipase    53  ()  U/L


 


Urine Color     (YELLOW)  


 


Urine Appearance     (CLEAR)  


 


Urine pH     (5-6)  


 


Ur Specific Gravity     (1.005-1.025)  


 


Urine Protein     (Negative)  


 


Urine Ketones     (NEGATIVE)  


 


Urine Blood     (0-5)  Jose Alberto/ul


 


Urine Nitrite     (NEGATIVE)  


 


Urine Bilirubin     (NEGATIVE)  


 


Urine Urobilinogen     (0-1)  mg/dL


 


Ur Leukocyte Esterase     (NEGATIVE)  


 


Urine WBC (Auto)     (0-5)  /HPF


 


Urine RBC (Auto)     (0-2)  /HPF


 


U Epithel Cells (Auto)     (FEW)  /HPF


 


Urine Bacteria (Auto)     (NEGATIVE)  /HPF


 


Urine Mucus (Auto)     (NEGATIVE)  /HPF


 


Urine Culture Reflexed     (NO)  


 


Urine Glucose     (NEGATIVE)  mg/dL


 


SARS-CoV-2 (PCR)     (NEGATIVE)  














  07/04/21 07/04/21 07/04/21 Range/Units





  14:15 15:36 17:37 


 


WBC     (4.0-10.5)  K/mm3


 


RBC     (4.1-5.6)  M/mm3


 


Hgb     (12.5-18.0)  gm/dl


 


Hct     (42-50)  %


 


MCV     ()  fl


 


MCH     (26-32)  pg


 


MCHC     (32-36)  g/dl


 


RDW     (11.5-14.0)  %


 


Plt Count     (150-450)  K/mm3


 


MPV     (7.5-11.0)  fl


 


Gran %     (36.0-66.0)  %


 


Eos # (Auto)     (0-0.5)  


 


Absolute Lymphs (auto)     (1.0-4.6)  


 


Absolute Monos (auto)     (0.0-1.3)  


 


Lymphocytes %     (24.0-44.0)  %


 


Monocytes %     (0.0-12.0)  %


 


Eosinophils %     (0.00-5.0)  %


 


Basophils %     (0.0-0.4)  %


 


Absolute Granulocytes     (1.4-6.9)  


 


Basophils #     (0-0.4)  


 


Sodium     (137-145)  mmol/L


 


Potassium     (3.5-5.1)  mmol/L


 


Chloride     ()  mmol/L


 


Carbon Dioxide     (22-30)  mmol/L


 


Anion Gap     (5-15)  MEQ/L


 


BUN     (9-20)  mg/dL


 


Creatinine     (0.66-1.25)  mg/dL


 


Estimated GFR     ML/MIN


 


Glucose     ()  mg/dL


 


POC Glucometer     (74 to 106)  mg/dL


 


Lactic Acid     (0.4-2.0)  


 


Calcium     (8.4-10.2)  mg/dL


 


Total Bilirubin     (0.2-1.3)  mg/dL


 


AST     (17-59)  U/L


 


ALT     (0-50)  U/L


 


Alkaline Phosphatase     ()  U/L


 


Troponin I  < 0.012   < 0.012  (0.000-0.034)  ng/mL


 


Serum Total Protein     (6.3-8.2)  g/dL


 


Albumin     (3.5-5.0)  g/dL


 


Amylase     ()  U/L


 


Lipase     ()  U/L


 


Urine Color   YELLOW   (YELLOW)  


 


Urine Appearance   CLEAR   (CLEAR)  


 


Urine pH   5.0   (5-6)  


 


Ur Specific Gravity   1.023   (1.005-1.025)  


 


Urine Protein   NEGATIVE   (Negative)  


 


Urine Ketones   NEGATIVE   (NEGATIVE)  


 


Urine Blood   NEGATIVE   (0-5)  Jose Alberto/ul


 


Urine Nitrite   NEGATIVE   (NEGATIVE)  


 


Urine Bilirubin   NEGATIVE   (NEGATIVE)  


 


Urine Urobilinogen   NEGATIVE   (0-1)  mg/dL


 


Ur Leukocyte Esterase   NEGATIVE   (NEGATIVE)  


 


Urine WBC (Auto)   NONE   (0-5)  /HPF


 


Urine RBC (Auto)   NONE   (0-2)  /HPF


 


U Epithel Cells (Auto)   NONE   (FEW)  /HPF


 


Urine Bacteria (Auto)   NONE   (NEGATIVE)  /HPF


 


Urine Mucus (Auto)   SLIGHT   (NEGATIVE)  /HPF


 


Urine Culture Reflexed   NO   (NO)  


 


Urine Glucose   >=500   (NEGATIVE)  mg/dL


 


SARS-CoV-2 (PCR)     (NEGATIVE)  














  07/04/21 07/04/21 07/05/21 Range/Units





  20:35 23:32 04:40 


 


WBC    13.3 H  (4.0-10.5)  K/mm3


 


RBC    4.14  (4.1-5.6)  M/mm3


 


Hgb    12.2 L  (12.5-18.0)  gm/dl


 


Hct    39.7 L  (42-50)  %


 


MCV    95.9  ()  fl


 


MCH    29.5  (26-32)  pg


 


MCHC    30.7 L  (32-36)  g/dl


 


RDW    14.5 H  (11.5-14.0)  %


 


Plt Count    478 H  (150-450)  K/mm3


 


MPV    11.0  (7.5-11.0)  fl


 


Gran %    58.1  (36.0-66.0)  %


 


Eos # (Auto)    0.39  (0-0.5)  


 


Absolute Lymphs (auto)    3.97  (1.0-4.6)  


 


Absolute Monos (auto)    1.16  (0.0-1.3)  


 


Lymphocytes %    29.8  (24.0-44.0)  %


 


Monocytes %    8.7  (0.0-12.0)  %


 


Eosinophils %    2.9  (0.00-5.0)  %


 


Basophils %    0.5  (0.0-0.4)  %


 


Absolute Granulocytes    7.75 H  (1.4-6.9)  


 


Basophils #    0.06  (0-0.4)  


 


Sodium     (137-145)  mmol/L


 


Potassium     (3.5-5.1)  mmol/L


 


Chloride     ()  mmol/L


 


Carbon Dioxide     (22-30)  mmol/L


 


Anion Gap     (5-15)  MEQ/L


 


BUN     (9-20)  mg/dL


 


Creatinine     (0.66-1.25)  mg/dL


 


Estimated GFR     ML/MIN


 


Glucose     ()  mg/dL


 


POC Glucometer   100   (74 to 106)  mg/dL


 


Lactic Acid     (0.4-2.0)  


 


Calcium     (8.4-10.2)  mg/dL


 


Total Bilirubin     (0.2-1.3)  mg/dL


 


AST     (17-59)  U/L


 


ALT     (0-50)  U/L


 


Alkaline Phosphatase     ()  U/L


 


Troponin I     (0.000-0.034)  ng/mL


 


Serum Total Protein     (6.3-8.2)  g/dL


 


Albumin     (3.5-5.0)  g/dL


 


Amylase     ()  U/L


 


Lipase     ()  U/L


 


Urine Color     (YELLOW)  


 


Urine Appearance     (CLEAR)  


 


Urine pH     (5-6)  


 


Ur Specific Gravity     (1.005-1.025)  


 


Urine Protein     (Negative)  


 


Urine Ketones     (NEGATIVE)  


 


Urine Blood     (0-5)  Jose Alberto/ul


 


Urine Nitrite     (NEGATIVE)  


 


Urine Bilirubin     (NEGATIVE)  


 


Urine Urobilinogen     (0-1)  mg/dL


 


Ur Leukocyte Esterase     (NEGATIVE)  


 


Urine WBC (Auto)     (0-5)  /HPF


 


Urine RBC (Auto)     (0-2)  /HPF


 


U Epithel Cells (Auto)     (FEW)  /HPF


 


Urine Bacteria (Auto)     (NEGATIVE)  /HPF


 


Urine Mucus (Auto)     (NEGATIVE)  /HPF


 


Urine Culture Reflexed     (NO)  


 


Urine Glucose     (NEGATIVE)  mg/dL


 


SARS-CoV-2 (PCR)  NEGATIVE    (NEGATIVE)  














  07/05/21 07/05/21 07/05/21 Range/Units





  04:40 04:50 07:04 


 


WBC     (4.0-10.5)  K/mm3


 


RBC     (4.1-5.6)  M/mm3


 


Hgb     (12.5-18.0)  gm/dl


 


Hct     (42-50)  %


 


MCV     ()  fl


 


MCH     (26-32)  pg


 


MCHC     (32-36)  g/dl


 


RDW     (11.5-14.0)  %


 


Plt Count     (150-450)  K/mm3


 


MPV     (7.5-11.0)  fl


 


Gran %     (36.0-66.0)  %


 


Eos # (Auto)     (0-0.5)  


 


Absolute Lymphs (auto)     (1.0-4.6)  


 


Absolute Monos (auto)     (0.0-1.3)  


 


Lymphocytes %     (24.0-44.0)  %


 


Monocytes %     (0.0-12.0)  %


 


Eosinophils %     (0.00-5.0)  %


 


Basophils %     (0.0-0.4)  %


 


Absolute Granulocytes     (1.4-6.9)  


 


Basophils #     (0-0.4)  


 


Sodium  132 L    (137-145)  mmol/L


 


Potassium  4.1    (3.5-5.1)  mmol/L


 


Chloride  98    ()  mmol/L


 


Carbon Dioxide  23    (22-30)  mmol/L


 


Anion Gap  15.2 H    (5-15)  MEQ/L


 


BUN  39 H    (9-20)  mg/dL


 


Creatinine  2.18 H    (0.66-1.25)  mg/dL


 


Estimated GFR  32.2    ML/MIN


 


Glucose  145 H    ()  mg/dL


 


POC Glucometer    130 H  (74 to 106)  mg/dL


 


Lactic Acid   1.4   (0.4-2.0)  


 


Calcium  9.2    (8.4-10.2)  mg/dL


 


Total Bilirubin  0.30    (0.2-1.3)  mg/dL


 


AST  30    (17-59)  U/L


 


ALT  15    (0-50)  U/L


 


Alkaline Phosphatase  72    ()  U/L


 


Troponin I     (0.000-0.034)  ng/mL


 


Serum Total Protein  6.8    (6.3-8.2)  g/dL


 


Albumin  3.7    (3.5-5.0)  g/dL


 


Amylase     ()  U/L


 


Lipase     ()  U/L


 


Urine Color     (YELLOW)  


 


Urine Appearance     (CLEAR)  


 


Urine pH     (5-6)  


 


Ur Specific Gravity     (1.005-1.025)  


 


Urine Protein     (Negative)  


 


Urine Ketones     (NEGATIVE)  


 


Urine Blood     (0-5)  Jose Alberto/ul


 


Urine Nitrite     (NEGATIVE)  


 


Urine Bilirubin     (NEGATIVE)  


 


Urine Urobilinogen     (0-1)  mg/dL


 


Ur Leukocyte Esterase     (NEGATIVE)  


 


Urine WBC (Auto)     (0-5)  /HPF


 


Urine RBC (Auto)     (0-2)  /HPF


 


U Epithel Cells (Auto)     (FEW)  /HPF


 


Urine Bacteria (Auto)     (NEGATIVE)  /HPF


 


Urine Mucus (Auto)     (NEGATIVE)  /HPF


 


Urine Culture Reflexed     (NO)  


 


Urine Glucose     (NEGATIVE)  mg/dL


 


SARS-CoV-2 (PCR)     (NEGATIVE)  








                                   Accuchecks











Date                           07/05/21


 


Time                           07:22

















- Radiology Impressions


Radiology Exams & Impressions: 


                              Radiology Procedures











 Category Date Time Status


 


 ABDOMEN AND PELVIS W/0 CONTRAS [CT] Stat Exams  07/04/21 14:09 Completed














Assessment/Plan


(1) Acute renal failure


Current Visit: No   Status: Acute   


Assessment & Plan: 


improved mildly with hydration since admission, likely secondary to dehydration








(2) Dehydration


Current Visit: Yes   Status: Acute   Code(s): E86.0 - DEHYDRATION   





(3) Post-op pain


Current Visit: Yes   Status: Acute   


Assessment & Plan: 


nothing on ct indicative of complication, exam is benign. advance to soft diet, 

surgery consult is pending


Code(s): G89.18 - OTHER ACUTE POSTPROCEDURAL PAIN   





(4) Diabetes mellitus


Current Visit: No   Status: Acute   Code(s): E11.9 - TYPE 2 DIABETES MELLITUS 

WITHOUT COMPLICATIONS

## 2021-07-16 ENCOUNTER — HOSPITAL ENCOUNTER (EMERGENCY)
Dept: HOSPITAL 33 - ED | Age: 66
Discharge: HOME | End: 2021-07-16
Payer: MEDICARE

## 2021-07-16 VITALS — HEART RATE: 77 BPM

## 2021-07-16 VITALS — SYSTOLIC BLOOD PRESSURE: 131 MMHG | DIASTOLIC BLOOD PRESSURE: 73 MMHG

## 2021-07-16 VITALS — OXYGEN SATURATION: 99 %

## 2021-07-16 DIAGNOSIS — E11.9: ICD-10-CM

## 2021-07-16 DIAGNOSIS — I10: ICD-10-CM

## 2021-07-16 DIAGNOSIS — E78.00: ICD-10-CM

## 2021-07-16 DIAGNOSIS — Z79.899: ICD-10-CM

## 2021-07-16 DIAGNOSIS — R10.9: Primary | ICD-10-CM

## 2021-07-16 LAB
ALBUMIN SERPL-MCNC: 4.3 G/DL (ref 3.5–5)
ALP SERPL-CCNC: 97 U/L (ref 38–126)
ALT SERPL-CCNC: 16 U/L (ref 0–50)
AMYLASE SERPL-CCNC: 54 U/L (ref 30–110)
ANION GAP SERPL CALC-SCNC: 19.8 MEQ/L (ref 5–15)
AST SERPL QL: 20 U/L (ref 17–59)
BASOPHILS # BLD AUTO: 0.02 10*3/UL (ref 0–0.4)
BASOPHILS NFR BLD AUTO: 0.1 % (ref 0–0.4)
BILIRUB BLD-MCNC: 0.3 MG/DL (ref 0.2–1.3)
BUN SERPL-MCNC: 30 MG/DL (ref 9–20)
CALCIUM SPEC-MCNC: 9.9 MG/DL (ref 8.4–10.2)
CHLORIDE SERPL-SCNC: 94 MMOL/L (ref 98–107)
CO2 SERPL-SCNC: 24 MMOL/L (ref 22–30)
CREAT SERPL-MCNC: 2.2 MG/DL (ref 0.66–1.25)
EOSINOPHIL # BLD AUTO: 0.11 10*3/UL (ref 0–0.5)
GFR SERPLBLD BASED ON 1.73 SQ M-ARVRAT: 31.9 ML/MIN
GLUCOSE SERPL-MCNC: 183 MG/DL (ref 74–106)
GLUCOSE UR-MCNC: >=500 MG/DL
HCT VFR BLD AUTO: 40.9 % (ref 42–50)
HGB BLD-MCNC: 12.7 GM/DL (ref 12.5–18)
LIPASE SERPL-CCNC: 26 U/L (ref 23–300)
LYMPHOCYTES # SPEC AUTO: 2.13 10*3/UL (ref 1–4.6)
MCH RBC QN AUTO: 29.3 PG (ref 26–32)
MCHC RBC AUTO-ENTMCNC: 31.1 G/DL (ref 32–36)
MONOCYTES # BLD AUTO: 1.11 10*3/UL (ref 0–1.3)
PLATELET # BLD AUTO: 485 K/MM3 (ref 150–450)
POTASSIUM SERPLBLD-SCNC: 3.6 MMOL/L (ref 3.5–5.1)
PROT SERPL-MCNC: 7.7 G/DL (ref 6.3–8.2)
PROT UR STRIP-MCNC: 30 MG/DL
RBC # BLD AUTO: 4.34 M/MM3 (ref 4.1–5.6)
RBC #/AREA URNS HPF: (no result) /HPF (ref 0–2)
SODIUM SERPL-SCNC: 134 MMOL/L (ref 137–145)
WBC # BLD AUTO: 14.3 K/MM3 (ref 4–10.5)
WBC #/AREA URNS HPF: (no result) /HPF (ref 0–5)

## 2021-07-16 PROCEDURE — 96376 TX/PRO/DX INJ SAME DRUG ADON: CPT

## 2021-07-16 PROCEDURE — 96374 THER/PROPH/DIAG INJ IV PUSH: CPT

## 2021-07-16 PROCEDURE — 74176 CT ABD & PELVIS W/O CONTRAST: CPT

## 2021-07-16 PROCEDURE — 85025 COMPLETE CBC W/AUTO DIFF WBC: CPT

## 2021-07-16 PROCEDURE — 96375 TX/PRO/DX INJ NEW DRUG ADDON: CPT

## 2021-07-16 PROCEDURE — 83690 ASSAY OF LIPASE: CPT

## 2021-07-16 PROCEDURE — 99284 EMERGENCY DEPT VISIT MOD MDM: CPT

## 2021-07-16 PROCEDURE — 80053 COMPREHEN METABOLIC PANEL: CPT

## 2021-07-16 PROCEDURE — 36415 COLL VENOUS BLD VENIPUNCTURE: CPT

## 2021-07-16 PROCEDURE — 93005 ELECTROCARDIOGRAM TRACING: CPT

## 2021-07-16 PROCEDURE — 81001 URINALYSIS AUTO W/SCOPE: CPT

## 2021-07-16 PROCEDURE — 36000 PLACE NEEDLE IN VEIN: CPT

## 2021-07-16 PROCEDURE — 96360 HYDRATION IV INFUSION INIT: CPT

## 2021-07-16 PROCEDURE — 82150 ASSAY OF AMYLASE: CPT

## 2021-07-16 PROCEDURE — 84484 ASSAY OF TROPONIN QUANT: CPT

## 2021-07-16 NOTE — XRAY
Indication: Right abdomen pain.  Diarrhea.



Multiple contiguous axial images obtained through the abdomen and pelvis

without contrast.



Comparison: April 28, June 21, and July 4, 2021.



Lung bases demonstrates minimal dependent atelectasis.  No infiltrate or

effusion.  Heart is not enlarged.  Again chronic right hemidiaphragm elevation.



Noncontrasted stomach and bowel loops remain nonobstructed with normal

appendix.  Stable mild sigmoid diverticulosis, fatty liver, splenic calcified

granulomas, small left adrenal adenoma, and cholecystectomy.  No free

fluid/air.



Remaining liver, pancreas, spleen, adrenal glands, kidneys, ureters, and

bladder are unremarkable for noncontrast exam.  Stable moderate aerially

calcifications without AAA.



Osseous structures intact again with mild/moderate degenerative changes

throughout the thoracolumbar spine and both hips.  Stable small fatty left

inguinal hernia and left midabdomen cutaneous/subcutaneous induration presumed

iatrogenic.



Impression: No change since the last 3 CT exams again demonstrating chronic

right hemidiaphragm elevation, sigmoid diverticulosis, left adrenal adenoma,

fatty liver, fatty left inguinal hernia, and chronic bony findings.

## 2021-07-16 NOTE — ERPHSYRPT
- History of Present Illness


Historian: patient


Exam Limitations: other (Poor historian)


Patient Subjective Stated Complaint: pt states he has been having rt side abd 

pain and diarrhea since he had  his gallbladder out june 28. rates pain in rt 

abd 10/10


Triage Nursing Assessment: pt alert and oriented, answers questions approp. pt 

ambulatory to er with steady gait noted. respirations nonlabored with lungs cta.

skin warm and dry. abd soft. pt reports tenderness to rt abd. hypo bowel sounds 

noted x4.


Physician History: 





66 yo wm s/o sergio on 6/28/21 presents w abdominal pain since the procedure. 

Pain is a 10 on scale, and nothing makes it better or worse. Pt followed up with

his surgeon last week. He has diarrhea/nausea wo vomiting/melena

/hematochezia/dysuria/hematuria/chest pain/fever. 


Timing/Duration: other (Since 6/28)


Activities at Onset: rest


Quality: other ("Just pain")


Abdominal Pain Onset Location: RUQ


Pain Radiation: no radiation


Severity of Pain-Max: severe


Severity of Pain-Current: severe


Modifying Factors: Improves With: nothing


Associated Symptoms: diarrhea, loss of appetite, nausea, No back, No chest pain,

No diaphoresis, No fever/chills, No fatigue, No headache, No heartburn, No neck 

pain, No rash, No shortness of breath, No syncope, No testicular pain, No 

vomiting, No weakness


Previous symptoms: same symptoms as today


Allergies/Adverse Reactions: 








codeine [Codeine] Adverse Reaction (Intermediate, Verified 07/16/21 07:23)


   Nausea and Vomiting





Home Medications: 








Fenofibrate Nanocrystallized [Fenofibrate] 145 mg PO DAILY 10/19/17 [History]


Pregabalin [Lyrica 100Mg***] 100 mg PO TID 10/19/17 [History]


Clopidogrel Bisulfate 75 mg*** [PLAVIX 75 MG Tablet***] 75 mg PO DAILY 06/17/21 

[History]


Empagliflozin [Jardiance] 10 mg PO DAILY 06/17/21 [History]


Insulin Detemir [Levemir] 75 units SQ BID 06/17/21 [History]


Lisinopril/Hydrochlorothiazide [Lisinopril-Hctz 10-12.5 mg Tab] 1 tab PO DAILY 

06/17/21 [History]


PANTOPRAZOLE 40 mg Tablet*** [Protonix 40MG Tablet***] 1 tab PO DAILY 06/17/21 

[History]


Aspirin 81 gm Chew*** [Baby Aspirin 81 mg Chew***] 81 mg PO DAILY 06/21/21 

[History]


Ciprofloxacin [Cipro 500 MG***] 500 mg PO BID 07/04/21 [History]


Metronidazole 500 mg*** [Flagyl 500 MG***] 500 mg PO TID 07/04/21 [History]





Hx Tetanus, Diphtheria Vaccination/Date Given: No


Hx Influenza Vaccination/Date Given: Yes


Hx Pneumococcal Vaccination/Date Given: Yes





Travel Risk





- International Travel


Have you traveled outside of the country in past 3 weeks: No





- Coronavirus Screening


Are you exhibiting any of the following symptoms?: Yes


Symptoms: Vomiting/Diarrhea, Loss of Taste or Smell, Headaches/Body 

Aches/Fatigue


Close contact with a COVID-19 positive Pt in past 14-21 Days: No





- Vaccine Status


Have you recieved a Covid-19 vaccination: Yes


: Verastem





- Review of Systems


Constitutional: No Symptoms


Eyes: No Symptoms


Ears, Nose, & Throat: No Symptoms


Respiratory: No Symptoms


Cardiac: No Symptoms


Abdominal/Gastrointestinal: No Symptoms, Abdominal Pain, Nausea, Diarrhea, No 

Vomiting, No Constipation, No Hematemesis, No Hematochezia, No Melena, No 

Dysphagia, No Appetite Changes


Genitourinary Symptoms: No Symptoms


Musculoskeletal: No Symptoms


Skin: No Symptoms


Neurological: No Symptoms


Psychological: No Symptoms


Endocrine: No Symptoms


Hematologic/Lymphatic: No Symptoms


Immunological/Allergic: No Symptoms





- Past Medical History


Pertinent Past Medical History: Yes (.)


Neurological History: No Pertinent History


ENT History: No Pertinent History


Cardiac History: High Cholesterol, Hypertension


Respiratory History: No Pertinent History


Endocrine Medical History: Diabetes Type II


Musculoskeletal History: Other


GI Medical History: GERD, Gallbladder Disease


 History: No Pertinent History


Psycho-Social History: No Pertinent History


Male Reproductive Disorders: No Pertinent History





- Past Surgical History


Past Surgical History: Yes


Neuro Surgical History: No Pertinent History


Cardiac: Vascular Surgery


Respiratory: No Pertinent History


Gastrointestinal: Cholecystectomy


Genitourinary: No Pertinent History


Musculoskeletal: Orthopedic Surgery


Male Surgical History: No Pertinent History


Other Surgical History: BACK ,NECK AND ELBOW SURG,LEFT SHOULDER. vascular 

surgery to right knee(stent placed in right knee)





- Social History


Smoking Status: Former smoker


How long have you smoked: 25 years


Exposure to second hand smoke: No


Alcohol Use: None


Drug Use: none


Patient Lives Alone: Yes


Significant Family History: no pertinent family hx





- Nursing Vital Signs


Nursing Vital Signs: 


                               Initial Vital Signs











Temperature  98.1 F   07/16/21 07:00


 


Pulse Rate  92 H  07/16/21 07:00


 


Respiratory Rate  18   07/16/21 07:00


 


Blood Pressure  155/94   07/16/21 07:00


 


O2 Sat by Pulse Oximetry  99   07/16/21 07:00








                                   Pain Scale











Pain Intensity                 2

















- Physical Exam


General Appearance: no apparent distress


Eye Exam: PERRL/EOMI, eyes nml inspection


Ears, Nose, Throat Exam: normal ENT inspection, TMs normal, pharynx normal, 

moist mucous membranes


Neck Exam: normal inspection, non-tender, supple, full range of motion, No 

meningismus, No mass, No Brudzinski, No Kernig's, No carotid bruit, No JVD


Respiratory Exam: normal breath sounds, lungs clear, airway intact, No chest 

tenderness, No respiratory distress


Cardiovascular Exam: regular rate/rhythm, normal heart sounds, normal peripheral

 pulses, No murmur


Gastrointestinal/Abdomen Exam: soft, normal bowel sounds, tenderness (RUQ wo 

guarding or rebound), No distention


Back Exam: normal inspection, normal range of motion, No CVA tenderness, No 

vertebral tenderness


Extremity Exam: normal inspection, normal range of motion


Neurologic Exam: alert, oriented x 3, cooperative, CNs II-XII nml as tested, 

normal mood/affect, nml cerebellar function, sensation nml, No motor deficits, N

o sensory deficit


Skin Exam: normal color, warm, dry


Lymphatic Exam: No adenopathy


**SpO2 Interpretation**: normal


SpO2: 99


O2 Delivery: Room Air





- Course


Nursing assessment & vital signs reviewed: Yes


EKG Interpreted by Me: RATE (NSR/Normal QT-QTc/No acute ST changes)





- CT Exams


  ** Abdomen/Pelvis


CT Interpretation: Discussed w/radiologist (No acute changes)


Ordered Tests: 


                               Active Orders 24 hr











 Category Date Time Status


 


 EKG-ER Only STAT Care  07/16/21 08:28 Completed


 


 IV Insertion STAT Care  07/16/21 07:31 Completed


 


 ABDOMEN AND PELVIS W/0 CONTRAS [CT] Stat Exams  07/16/21 08:42 Completed


 


 AMYLASE Stat Lab  07/16/21 07:10 Completed


 


 CBC W DIFF Stat Lab  07/16/21 07:10 Completed


 


 CMP Stat Lab  07/16/21 07:10 Completed


 


 LIPASE Stat Lab  07/16/21 07:10 Completed


 


 TROPONIN Q3H Lab  07/16/21 07:45 Completed


 


 TROPONIN Q3H Lab  07/16/21 10:50 Completed


 


 UA W/RFX UR CULTURE Stat Lab  07/16/21 07:51 Completed








Medication Summary














Discontinued Medications














Generic Name Dose Route Start Last Admin





  Trade Name Konstantin  PRN Reason Stop Dose Admin


 


Fentanyl Citrate  Confirm  07/16/21 07:30 





  Sublimaze 100 Mcg/2 Ml***  Administered  07/16/21 07:31 





  Dose  





  100 mcg  





  .ROUTE  





  .STK-MED ONE  


 


Fentanyl Citrate  50 mcg  07/16/21 07:32  07/16/21 07:48





  Sublimaze 100 Mcg/2 Ml***  IV  07/16/21 07:33  50 mcg





  STAT ONE   Administration


 


Fentanyl Citrate  50 mcg  07/16/21 09:34  07/16/21 09:40





  Sublimaze 100 Mcg/2 Ml***  IV  07/16/21 09:35  50 mcg





  STAT ONE   Administration


 


Fentanyl Citrate  Confirm  07/16/21 09:39 





  Sublimaze 100 Mcg/2 Ml***  Administered  07/16/21 09:40 





  Dose  





  100 mcg  





  .ROUTE  





  .STK-MED ONE  


 


Sodium Chloride  Confirm  07/16/21 07:31 





  Sodium Chloride 0.9% 1000 Ml  Administered  07/16/21 07:32 





  Dose  





  1,000 mls @ ud  





  .ROUTE  





  .STK-MED ONE  


 


Sodium Chloride  1,000 mls @ 999 mls/hr  07/16/21 07:31  07/16/21 08:51





  Sodium Chloride 0.9% 1000 Ml  IV  07/16/21 08:31  Infused





  .Q1H1M STA   Infusion


 


Ondansetron HCl  Confirm  07/16/21 07:29 





  Zofran 4 Mg/2 Ml Vial**  Administered  07/16/21 07:30 





  Dose  





  4 mg  





  .ROUTE  





  .STK-MED ONE  


 


Ondansetron HCl  4 mg  07/16/21 07:33  07/16/21 07:47





  Zofran 4 Mg/2 Ml Vial**  IV  07/16/21 07:34  4 mg





  STAT ONE   Administration











Lab/Rad Data: 


                           Laboratory Result Diagrams





                                 07/16/21 07:10 





                                 07/16/21 07:10 





                               Laboratory Results











  07/16/21 07/16/21 07/16/21 Range/Units





  10:50 07:51 07:45 


 


WBC     (4.0-10.5)  K/mm3


 


RBC     (4.1-5.6)  M/mm3


 


Hgb     (12.5-18.0)  gm/dl


 


Hct     (42-50)  %


 


MCV     ()  fl


 


MCH     (26-32)  pg


 


MCHC     (32-36)  g/dl


 


RDW     (11.5-14.0)  %


 


Plt Count     (150-450)  K/mm3


 


MPV     (7.5-11.0)  fl


 


Gran %     (36.0-66.0)  %


 


Eos # (Auto)     (0-0.5)  


 


Absolute Lymphs (auto)     (1.0-4.6)  


 


Absolute Monos (auto)     (0.0-1.3)  


 


Lymphocytes %     (24.0-44.0)  %


 


Monocytes %     (0.0-12.0)  %


 


Eosinophils %     (0.00-5.0)  %


 


Basophils %     (0.0-0.4)  %


 


Absolute Granulocytes     (1.4-6.9)  


 


Basophils #     (0-0.4)  


 


Sodium     (137-145)  mmol/L


 


Potassium     (3.5-5.1)  mmol/L


 


Chloride     ()  mmol/L


 


Carbon Dioxide     (22-30)  mmol/L


 


Anion Gap     (5-15)  MEQ/L


 


BUN     (9-20)  mg/dL


 


Creatinine     (0.66-1.25)  mg/dL


 


Estimated GFR     ML/MIN


 


Glucose     ()  mg/dL


 


Calcium     (8.4-10.2)  mg/dL


 


Total Bilirubin     (0.2-1.3)  mg/dL


 


AST     (17-59)  U/L


 


ALT     (0-50)  U/L


 


Alkaline Phosphatase     ()  U/L


 


Troponin I  < 0.012   < 0.012  (0.000-0.034)  ng/mL


 


Serum Total Protein     (6.3-8.2)  g/dL


 


Albumin     (3.5-5.0)  g/dL


 


Amylase     ()  U/L


 


Lipase     ()  U/L


 


Urine Color   YELLOW   (YELLOW)  


 


Urine Appearance   CLEAR   (CLEAR)  


 


Urine pH   5.0   (5-6)  


 


Ur Specific Gravity   1.022   (1.005-1.025)  


 


Urine Protein   30   (Negative)  


 


Urine Ketones   NEGATIVE   (NEGATIVE)  


 


Urine Blood   NEGATIVE   (0-5)  Jose Alberto/ul


 


Urine Nitrite   NEGATIVE   (NEGATIVE)  


 


Urine Bilirubin   NEGATIVE   (NEGATIVE)  


 


Urine Urobilinogen   NEGATIVE   (0-1)  mg/dL


 


Ur Leukocyte Esterase   NEGATIVE   (NEGATIVE)  


 


Urine WBC (Auto)   0-2   (0-5)  /HPF


 


Urine RBC (Auto)   NONE SEEN   (0-2)  /HPF


 


U Epithel Cells (Auto)   NONE   (FEW)  /HPF


 


Urine Bacteria (Auto)   NONE   (NEGATIVE)  /HPF


 


Urine Mucus (Auto)   SLIGHT   (NEGATIVE)  /HPF


 


Urine Culture Reflexed   NO   (NO)  


 


Urine Glucose   >=500   (NEGATIVE)  mg/dL














  07/16/21 07/16/21 Range/Units





  07:10 07:10 


 


WBC   14.3 H  (4.0-10.5)  K/mm3


 


RBC   4.34  (4.1-5.6)  M/mm3


 


Hgb   12.7  (12.5-18.0)  gm/dl


 


Hct   40.9 L  (42-50)  %


 


MCV   94.2  ()  fl


 


MCH   29.3  (26-32)  pg


 


MCHC   31.1 L  (32-36)  g/dl


 


RDW   14.7 H  (11.5-14.0)  %


 


Plt Count   485 H  (150-450)  K/mm3


 


MPV   11.3 H  (7.5-11.0)  fl


 


Gran %   76.4 H  (36.0-66.0)  %


 


Eos # (Auto)   0.11  (0-0.5)  


 


Absolute Lymphs (auto)   2.13  (1.0-4.6)  


 


Absolute Monos (auto)   1.11  (0.0-1.3)  


 


Lymphocytes %   14.9 L  (24.0-44.0)  %


 


Monocytes %   7.8  (0.0-12.0)  %


 


Eosinophils %   0.8  (0.00-5.0)  %


 


Basophils %   0.1  (0.0-0.4)  %


 


Absolute Granulocytes   10.90 H  (1.4-6.9)  


 


Basophils #   0.02  (0-0.4)  


 


Sodium  134 L   (137-145)  mmol/L


 


Potassium  3.6   (3.5-5.1)  mmol/L


 


Chloride  94 L   ()  mmol/L


 


Carbon Dioxide  24   (22-30)  mmol/L


 


Anion Gap  19.8 H   (5-15)  MEQ/L


 


BUN  30 H   (9-20)  mg/dL


 


Creatinine  2.20 H   (0.66-1.25)  mg/dL


 


Estimated GFR  31.9   ML/MIN


 


Glucose  183 H   ()  mg/dL


 


Calcium  9.9   (8.4-10.2)  mg/dL


 


Total Bilirubin  0.30   (0.2-1.3)  mg/dL


 


AST  20   (17-59)  U/L


 


ALT  16   (0-50)  U/L


 


Alkaline Phosphatase  97   ()  U/L


 


Troponin I    (0.000-0.034)  ng/mL


 


Serum Total Protein  7.7   (6.3-8.2)  g/dL


 


Albumin  4.3   (3.5-5.0)  g/dL


 


Amylase  54   ()  U/L


 


Lipase  26   ()  U/L


 


Urine Color    (YELLOW)  


 


Urine Appearance    (CLEAR)  


 


Urine pH    (5-6)  


 


Ur Specific Gravity    (1.005-1.025)  


 


Urine Protein    (Negative)  


 


Urine Ketones    (NEGATIVE)  


 


Urine Blood    (0-5)  Jose Alberto/ul


 


Urine Nitrite    (NEGATIVE)  


 


Urine Bilirubin    (NEGATIVE)  


 


Urine Urobilinogen    (0-1)  mg/dL


 


Ur Leukocyte Esterase    (NEGATIVE)  


 


Urine WBC (Auto)    (0-5)  /HPF


 


Urine RBC (Auto)    (0-2)  /HPF


 


U Epithel Cells (Auto)    (FEW)  /HPF


 


Urine Bacteria (Auto)    (NEGATIVE)  /HPF


 


Urine Mucus (Auto)    (NEGATIVE)  /HPF


 


Urine Culture Reflexed    (NO)  


 


Urine Glucose    (NEGATIVE)  mg/dL








WBC 14.3





- Progress


Progress: improved


Progress Note: 





07/16/21 10:22


50umg IV Fentanyl/4mg IV Zofran w improvement in pain


Pain returned, so 50umg IV Fentanyl repeated w improvement in pain


07/16/21 11:23


Troponin neg x2


07/16/21 11:43


Pt w minimal pain at best when discharged. 


Counseled pt/family regarding: lab results, diagnosis, need for follow-up, rad 

results





- Departure


Departure Disposition: Home


Clinical Impression: 


 Abdominal pain





Condition: Stable


Critical Care Time: No


Referrals: 


REYNA DE LA ROSA MD [Primary Care Provider] - 


Instructions:  Acute Abdomen (Belly Pain), Adult (DC)


Additional Instructions: 


Follow up with Dr. French


Return to ER for increasing pain or temperature greater than 100.5